# Patient Record
Sex: FEMALE | Race: BLACK OR AFRICAN AMERICAN | NOT HISPANIC OR LATINO | Employment: UNEMPLOYED | URBAN - METROPOLITAN AREA
[De-identification: names, ages, dates, MRNs, and addresses within clinical notes are randomized per-mention and may not be internally consistent; named-entity substitution may affect disease eponyms.]

---

## 2019-10-08 ENCOUNTER — HISTORICAL (OUTPATIENT)
Dept: RADIOLOGY | Facility: HOSPITAL | Age: 54
End: 2019-10-08

## 2019-10-08 LAB
ABS NEUT (OLG): 1.16 X10(3)/MCL (ref 2.1–9.2)
ALBUMIN SERPL-MCNC: 4 GM/DL (ref 3.4–5)
ALBUMIN/GLOB SERPL: 0.9 RATIO (ref 1.1–2)
ALP SERPL-CCNC: 142 UNIT/L (ref 45–117)
ALT SERPL-CCNC: 21 UNIT/L (ref 12–78)
APPEARANCE, UA: ABNORMAL
AST SERPL-CCNC: 16 UNIT/L (ref 15–37)
BACTERIA #/AREA URNS AUTO: ABNORMAL /[HPF]
BASOPHILS # BLD AUTO: 0 X10(3)/MCL (ref 0–0.2)
BASOPHILS NFR BLD AUTO: 1 %
BILIRUB SERPL-MCNC: 0.5 MG/DL (ref 0.2–1)
BILIRUB UR QL STRIP: NEGATIVE
BILIRUBIN DIRECT+TOT PNL SERPL-MCNC: 0.2 MG/DL (ref 0–0.2)
BILIRUBIN DIRECT+TOT PNL SERPL-MCNC: 0.3 MG/DL
BUN SERPL-MCNC: 12 MG/DL (ref 7–18)
CALCIUM SERPL-MCNC: 9.7 MG/DL (ref 8.5–10.1)
CHLORIDE SERPL-SCNC: 106 MMOL/L (ref 98–107)
CHOLEST SERPL-MCNC: 189 MG/DL
CHOLEST/HDLC SERPL: 2.5 {RATIO} (ref 0–4.4)
CO2 SERPL-SCNC: 29 MMOL/L (ref 21–32)
COLOR UR: YELLOW
CREAT SERPL-MCNC: 1 MG/DL (ref 0.6–1.3)
EOSINOPHIL # BLD AUTO: 0.2 X10(3)/MCL (ref 0–0.9)
EOSINOPHIL NFR BLD AUTO: 6 %
ERYTHROCYTE [DISTWIDTH] IN BLOOD BY AUTOMATED COUNT: 12.3 % (ref 11.5–14.5)
EST. AVERAGE GLUCOSE BLD GHB EST-MCNC: 148 MG/DL
GLOBULIN SER-MCNC: 4.3 GM/ML (ref 2.3–3.5)
GLUCOSE (UA): NORMAL
GLUCOSE SERPL-MCNC: 90 MG/DL (ref 74–106)
HBA1C MFR BLD: 6.8 % (ref 4.2–6.3)
HCT VFR BLD AUTO: 44.2 % (ref 35–46)
HDLC SERPL-MCNC: 76 MG/DL (ref 40–59)
HGB BLD-MCNC: 14.4 GM/DL (ref 12–16)
HGB UR QL STRIP: NEGATIVE
HYALINE CASTS #/AREA URNS LPF: ABNORMAL /[LPF]
IMM GRANULOCYTES # BLD AUTO: 0.01 10*3/UL
IMM GRANULOCYTES NFR BLD AUTO: 0 %
KETONES UR QL STRIP: ABNORMAL
LDLC SERPL CALC-MCNC: 103 MG/DL
LEUKOCYTE ESTERASE UR QL STRIP: 25 LEU/UL
LYMPHOCYTES # BLD AUTO: 1.8 X10(3)/MCL (ref 0.6–4.6)
LYMPHOCYTES NFR BLD AUTO: 48 %
MCH RBC QN AUTO: 30.4 PG (ref 26–34)
MCHC RBC AUTO-ENTMCNC: 32.6 GM/DL (ref 31–37)
MCV RBC AUTO: 93.2 FL (ref 80–100)
MONOCYTES # BLD AUTO: 0.4 X10(3)/MCL (ref 0.1–1.3)
MONOCYTES NFR BLD AUTO: 12 %
NEUTROPHILS # BLD AUTO: 1.16 X10(3)/MCL (ref 2.1–9.2)
NEUTROPHILS NFR BLD AUTO: 31 %
NITRITE UR QL STRIP: NEGATIVE
PH UR STRIP: 6 [PH] (ref 4.5–8)
PLATELET # BLD AUTO: 216 X10(3)/MCL (ref 130–400)
PMV BLD AUTO: 9.4 FL (ref 7.4–10.4)
POTASSIUM SERPL-SCNC: 4.6 MMOL/L (ref 3.5–5.1)
PROT SERPL-MCNC: 8.3 GM/DL (ref 6.4–8.2)
PROT UR QL STRIP: 20 MG/DL
RBC # BLD AUTO: 4.74 X10(6)/MCL (ref 4–5.2)
RBC #/AREA URNS AUTO: ABNORMAL /[HPF]
SODIUM SERPL-SCNC: 138 MMOL/L (ref 136–145)
SP GR UR STRIP: 1.03 (ref 1–1.03)
SQUAMOUS #/AREA URNS LPF: >100 /[LPF]
TRIGL SERPL-MCNC: 52 MG/DL
TSH SERPL-ACNC: 0.6 MIU/L (ref 0.36–3.74)
UROBILINOGEN UR STRIP-ACNC: 2 MG/DL
VLDLC SERPL CALC-MCNC: 10 MG/DL
WBC # SPEC AUTO: 3.7 X10(3)/MCL (ref 4.5–11)
WBC #/AREA URNS AUTO: ABNORMAL /HPF

## 2019-10-23 ENCOUNTER — HISTORICAL (OUTPATIENT)
Dept: INTERNAL MEDICINE | Facility: CLINIC | Age: 54
End: 2019-10-23

## 2020-02-25 ENCOUNTER — HISTORICAL (OUTPATIENT)
Dept: ADMINISTRATIVE | Facility: HOSPITAL | Age: 55
End: 2020-02-25

## 2020-02-25 ENCOUNTER — HISTORICAL (OUTPATIENT)
Dept: LAB | Facility: HOSPITAL | Age: 55
End: 2020-02-25

## 2020-02-25 LAB
ALBUMIN SERPL-MCNC: 3.5 GM/DL (ref 3.4–5)
ALBUMIN/GLOB SERPL: 0.8 RATIO (ref 1.1–2)
ALP SERPL-CCNC: 136 UNIT/L (ref 45–117)
ALT SERPL-CCNC: 25 UNIT/L (ref 12–78)
APPEARANCE, UA: CLEAR
AST SERPL-CCNC: 18 UNIT/L (ref 15–37)
BACTERIA #/AREA URNS AUTO: ABNORMAL /HPF
BILIRUB SERPL-MCNC: 0.4 MG/DL (ref 0.2–1)
BILIRUB UR QL STRIP: NEGATIVE
BILIRUBIN DIRECT+TOT PNL SERPL-MCNC: 0.1 MG/DL (ref 0–0.2)
BILIRUBIN DIRECT+TOT PNL SERPL-MCNC: 0.3 MG/DL
BUN SERPL-MCNC: 13 MG/DL (ref 7–18)
CALCIUM SERPL-MCNC: 9.1 MG/DL (ref 8.5–10.1)
CHLORIDE SERPL-SCNC: 109 MMOL/L (ref 98–107)
CO2 SERPL-SCNC: 26 MMOL/L (ref 21–32)
COLOR UR: YELLOW
CREAT SERPL-MCNC: 0.7 MG/DL (ref 0.6–1.3)
EST. AVERAGE GLUCOSE BLD GHB EST-MCNC: 148 MG/DL
GLOBULIN SER-MCNC: 4.4 GM/ML (ref 2.3–3.5)
GLUCOSE (UA): NEGATIVE
GLUCOSE SERPL-MCNC: 109 MG/DL (ref 74–106)
HBA1C MFR BLD: 6.8 % (ref 4.2–6.3)
HGB UR QL STRIP: NEGATIVE
HYALINE CASTS #/AREA URNS LPF: ABNORMAL /LPF
KETONES UR QL STRIP: NEGATIVE
LEUKOCYTE ESTERASE UR QL STRIP: NEGATIVE
NITRITE UR QL STRIP: NEGATIVE
PH UR STRIP: 6.5 [PH] (ref 4.5–8)
POTASSIUM SERPL-SCNC: 4.3 MMOL/L (ref 3.5–5.1)
PROT SERPL-MCNC: 7.9 GM/DL (ref 6.4–8.2)
PROT UR QL STRIP: NEGATIVE
RBC #/AREA URNS AUTO: ABNORMAL /HPF
SODIUM SERPL-SCNC: 139 MMOL/L (ref 136–145)
SP GR UR STRIP: 1.02 (ref 1–1.03)
SQUAMOUS #/AREA URNS LPF: >100 /LPF
UROBILINOGEN UR STRIP-ACNC: 2 MG/DL
WBC #/AREA URNS AUTO: ABNORMAL /HPF

## 2020-09-03 LAB
HIGH RISK HPV 16 (PRECISION): NEGATIVE
HIGH RISK HPV 18/45 (PRECISION): NEGATIVE
PAP RECOMMENDATION EXT: NORMAL
PAP SMEAR: NORMAL

## 2020-09-30 ENCOUNTER — HISTORICAL (OUTPATIENT)
Dept: INTERNAL MEDICINE | Facility: CLINIC | Age: 55
End: 2020-09-30

## 2020-09-30 LAB
ABS NEUT (OLG): 1.47 X10(3)/MCL (ref 2.1–9.2)
ALBUMIN SERPL-MCNC: 3.5 GM/DL (ref 3.4–5)
ALBUMIN/GLOB SERPL: 0.8 RATIO (ref 1.1–2)
ALP SERPL-CCNC: 141 UNIT/L (ref 45–117)
ALT SERPL-CCNC: 18 UNIT/L (ref 12–78)
APPEARANCE, UA: CLEAR
AST SERPL-CCNC: 11 UNIT/L (ref 15–37)
BACTERIA #/AREA URNS AUTO: ABNORMAL /HPF
BASOPHILS # BLD AUTO: 0 X10(3)/MCL (ref 0–0.2)
BASOPHILS NFR BLD AUTO: 1 %
BILIRUB SERPL-MCNC: 0.4 MG/DL (ref 0.2–1)
BILIRUB UR QL STRIP: NEGATIVE
BILIRUBIN DIRECT+TOT PNL SERPL-MCNC: 0.1 MG/DL (ref 0–0.2)
BILIRUBIN DIRECT+TOT PNL SERPL-MCNC: 0.3 MG/DL
BUN SERPL-MCNC: 14 MG/DL (ref 7–18)
CALCIUM SERPL-MCNC: 9.3 MG/DL (ref 8.5–10.1)
CHLORIDE SERPL-SCNC: 108 MMOL/L (ref 98–107)
CHOLEST SERPL-MCNC: 164 MG/DL
CHOLEST/HDLC SERPL: 2.7 {RATIO} (ref 0–4.4)
CO2 SERPL-SCNC: 28 MMOL/L (ref 21–32)
COLOR UR: YELLOW
CREAT SERPL-MCNC: 0.8 MG/DL (ref 0.6–1.3)
EOSINOPHIL # BLD AUTO: 0.4 X10(3)/MCL (ref 0–0.9)
EOSINOPHIL NFR BLD AUTO: 8 %
ERYTHROCYTE [DISTWIDTH] IN BLOOD BY AUTOMATED COUNT: 12.4 % (ref 11.5–14.5)
EST. AVERAGE GLUCOSE BLD GHB EST-MCNC: 131 MG/DL
GLOBULIN SER-MCNC: 4.2 GM/ML (ref 2.3–3.5)
GLUCOSE (UA): NEGATIVE
GLUCOSE SERPL-MCNC: 111 MG/DL (ref 74–106)
HBA1C MFR BLD: 6.2 % (ref 4.2–6.3)
HCT VFR BLD AUTO: 41.3 % (ref 35–46)
HDLC SERPL-MCNC: 61 MG/DL (ref 40–59)
HGB BLD-MCNC: 13.2 GM/DL (ref 12–16)
HGB UR QL STRIP: NEGATIVE
HYALINE CASTS #/AREA URNS LPF: ABNORMAL /LPF
IMM GRANULOCYTES # BLD AUTO: 0.01 10*3/UL
IMM GRANULOCYTES NFR BLD AUTO: 0 %
KETONES UR QL STRIP: NEGATIVE
LDLC SERPL CALC-MCNC: 88 MG/DL
LEUKOCYTE ESTERASE UR QL STRIP: NEGATIVE
LYMPHOCYTES # BLD AUTO: 2 X10(3)/MCL (ref 0.6–4.6)
LYMPHOCYTES NFR BLD AUTO: 45 %
MCH RBC QN AUTO: 30.1 PG (ref 26–34)
MCHC RBC AUTO-ENTMCNC: 32 GM/DL (ref 31–37)
MCV RBC AUTO: 94.1 FL (ref 80–100)
MONOCYTES # BLD AUTO: 0.5 X10(3)/MCL (ref 0.1–1.3)
MONOCYTES NFR BLD AUTO: 12 %
NEUTROPHILS # BLD AUTO: 1.47 X10(3)/MCL (ref 2.1–9.2)
NEUTROPHILS NFR BLD AUTO: 34 %
NITRITE UR QL STRIP: NEGATIVE
PH UR STRIP: 6 [PH] (ref 4.5–8)
PLATELET # BLD AUTO: 200 X10(3)/MCL (ref 130–400)
PMV BLD AUTO: 9.2 FL (ref 7.4–10.4)
POTASSIUM SERPL-SCNC: 4.3 MMOL/L (ref 3.5–5.1)
PROT SERPL-MCNC: 7.7 GM/DL (ref 6.4–8.2)
PROT UR QL STRIP: 10 MG/DL
RBC # BLD AUTO: 4.39 X10(6)/MCL (ref 4–5.2)
RBC #/AREA URNS AUTO: ABNORMAL /HPF
SODIUM SERPL-SCNC: 140 MMOL/L (ref 136–145)
SP GR UR STRIP: 1.02 (ref 1–1.03)
SQUAMOUS #/AREA URNS LPF: >100 /LPF
TRIGL SERPL-MCNC: 75 MG/DL
TSH SERPL-ACNC: 1.67 MIU/L (ref 0.36–3.74)
UROBILINOGEN UR STRIP-ACNC: 2 MG/DL
VLDLC SERPL CALC-MCNC: 15 MG/DL
WBC # SPEC AUTO: 4.4 X10(3)/MCL (ref 4.5–11)
WBC #/AREA URNS AUTO: ABNORMAL /HPF

## 2020-12-07 ENCOUNTER — HISTORICAL (OUTPATIENT)
Dept: RADIOLOGY | Facility: HOSPITAL | Age: 55
End: 2020-12-07

## 2021-01-19 ENCOUNTER — HISTORICAL (OUTPATIENT)
Dept: INTERNAL MEDICINE | Facility: CLINIC | Age: 56
End: 2021-01-19

## 2021-01-19 LAB
APPEARANCE, UA: ABNORMAL
BACTERIA #/AREA URNS AUTO: ABNORMAL /HPF
BILIRUB UR QL STRIP: NEGATIVE
BUN SERPL-MCNC: 12 MG/DL (ref 9.8–20.1)
CALCIUM SERPL-MCNC: 9.9 MG/DL (ref 8.4–10.2)
CHLORIDE SERPL-SCNC: 104 MMOL/L (ref 98–107)
CO2 SERPL-SCNC: 26 MMOL/L (ref 22–29)
COLOR UR: YELLOW
CREAT SERPL-MCNC: 0.77 MG/DL (ref 0.55–1.02)
CREAT/UREA NIT SERPL: 16
EST. AVERAGE GLUCOSE BLD GHB EST-MCNC: 116.9 MG/DL
GLUCOSE (UA): NEGATIVE
GLUCOSE SERPL-MCNC: 95 MG/DL (ref 74–100)
HBA1C MFR BLD: 5.7 %
HGB UR QL STRIP: NEGATIVE
HYALINE CASTS #/AREA URNS LPF: ABNORMAL /LPF
KETONES UR QL STRIP: NEGATIVE
LEUKOCYTE ESTERASE UR QL STRIP: NEGATIVE
NITRITE UR QL STRIP: NEGATIVE
PH UR STRIP: 5.5 [PH] (ref 4.5–8)
POTASSIUM SERPL-SCNC: 4.5 MMOL/L (ref 3.5–5.1)
PROT UR QL STRIP: 20 MG/DL
RBC #/AREA URNS AUTO: ABNORMAL /HPF
SODIUM SERPL-SCNC: 139 MMOL/L (ref 136–145)
SP GR UR STRIP: 1.02 (ref 1–1.03)
SQUAMOUS #/AREA URNS LPF: >100 /LPF
UROBILINOGEN UR STRIP-ACNC: 2 MG/DL
WBC #/AREA URNS AUTO: ABNORMAL /HPF

## 2021-06-01 ENCOUNTER — HISTORICAL (OUTPATIENT)
Dept: INTERNAL MEDICINE | Facility: CLINIC | Age: 56
End: 2021-06-01

## 2021-06-01 LAB
ABS NEUT (OLG): 1.35 X10(3)/MCL (ref 2.1–9.2)
ALBUMIN SERPL-MCNC: 3.9 GM/DL (ref 3.5–5)
ALBUMIN/GLOB SERPL: 1 RATIO (ref 1.1–2)
ALP SERPL-CCNC: 148 UNIT/L (ref 40–150)
ALT SERPL-CCNC: 14 UNIT/L (ref 0–55)
AST SERPL-CCNC: 15 UNIT/L (ref 5–34)
BASOPHILS # BLD AUTO: 0.1 X10(3)/MCL (ref 0–0.2)
BASOPHILS NFR BLD AUTO: 2 %
BILIRUB SERPL-MCNC: 0.5 MG/DL
BILIRUBIN DIRECT+TOT PNL SERPL-MCNC: 0.2 MG/DL (ref 0–0.5)
BILIRUBIN DIRECT+TOT PNL SERPL-MCNC: 0.3 MG/DL (ref 0–0.8)
BUN SERPL-MCNC: 12.3 MG/DL (ref 9.8–20.1)
CALCIUM SERPL-MCNC: 9.9 MG/DL (ref 8.4–10.2)
CHLORIDE SERPL-SCNC: 105 MMOL/L (ref 98–107)
CO2 SERPL-SCNC: 25 MMOL/L (ref 22–29)
CREAT SERPL-MCNC: 0.79 MG/DL (ref 0.55–1.02)
EOSINOPHIL # BLD AUTO: 0.3 X10(3)/MCL (ref 0–0.9)
EOSINOPHIL NFR BLD AUTO: 7 %
ERYTHROCYTE [DISTWIDTH] IN BLOOD BY AUTOMATED COUNT: 12 % (ref 11.5–14.5)
EST. AVERAGE GLUCOSE BLD GHB EST-MCNC: 116.9 MG/DL
GLOBULIN SER-MCNC: 3.9 GM/DL (ref 2.4–3.5)
GLUCOSE SERPL-MCNC: 123 MG/DL (ref 74–100)
HBA1C MFR BLD: 5.7 %
HCT VFR BLD AUTO: 41.4 % (ref 35–46)
HGB BLD-MCNC: 13.4 GM/DL (ref 12–16)
IMM GRANULOCYTES # BLD AUTO: 0.01 10*3/UL
IMM GRANULOCYTES NFR BLD AUTO: 0 %
LYMPHOCYTES # BLD AUTO: 1.8 X10(3)/MCL (ref 0.6–4.6)
LYMPHOCYTES NFR BLD AUTO: 46 %
MCH RBC QN AUTO: 29.9 PG (ref 26–34)
MCHC RBC AUTO-ENTMCNC: 32.4 GM/DL (ref 31–37)
MCV RBC AUTO: 92.4 FL (ref 80–100)
MONOCYTES # BLD AUTO: 0.4 X10(3)/MCL (ref 0.1–1.3)
MONOCYTES NFR BLD AUTO: 11 %
NEUTROPHILS # BLD AUTO: 1.35 X10(3)/MCL (ref 2.1–9.2)
NEUTROPHILS NFR BLD AUTO: 34 %
NRBC BLD AUTO-RTO: 0 % (ref 0–0.2)
PLATELET # BLD AUTO: 218 X10(3)/MCL (ref 130–400)
PMV BLD AUTO: 9.4 FL (ref 7.4–10.4)
POTASSIUM SERPL-SCNC: 4 MMOL/L (ref 3.5–5.1)
PROT SERPL-MCNC: 7.8 GM/DL (ref 6.4–8.3)
RBC # BLD AUTO: 4.48 X10(6)/MCL (ref 4–5.2)
SODIUM SERPL-SCNC: 138 MMOL/L (ref 136–145)
WBC # SPEC AUTO: 3.9 X10(3)/MCL (ref 4.5–11)

## 2021-10-04 ENCOUNTER — HISTORICAL (OUTPATIENT)
Dept: ADMINISTRATIVE | Facility: HOSPITAL | Age: 56
End: 2021-10-04

## 2021-10-04 LAB
ABS NEUT (OLG): 1.6 X10(3)/MCL (ref 2.1–9.2)
ALBUMIN SERPL-MCNC: 3.8 GM/DL (ref 3.5–5)
ALBUMIN/GLOB SERPL: 0.9 RATIO (ref 1.1–2)
ALP SERPL-CCNC: 135 UNIT/L (ref 40–150)
ALT SERPL-CCNC: 10 UNIT/L (ref 0–55)
APPEARANCE, UA: CLEAR
AST SERPL-CCNC: 15 UNIT/L (ref 5–34)
BACTERIA #/AREA URNS AUTO: ABNORMAL /HPF
BASOPHILS # BLD AUTO: 0.1 X10(3)/MCL (ref 0–0.2)
BASOPHILS NFR BLD AUTO: 1 %
BILIRUB SERPL-MCNC: 0.4 MG/DL
BILIRUB UR QL STRIP: NEGATIVE
BILIRUBIN DIRECT+TOT PNL SERPL-MCNC: 0.2 MG/DL (ref 0–0.5)
BILIRUBIN DIRECT+TOT PNL SERPL-MCNC: 0.2 MG/DL (ref 0–0.8)
BUN SERPL-MCNC: 17.2 MG/DL (ref 9.8–20.1)
CALCIUM SERPL-MCNC: 10.4 MG/DL (ref 8.4–10.2)
CHLORIDE SERPL-SCNC: 106 MMOL/L (ref 98–107)
CHOLEST SERPL-MCNC: 177 MG/DL
CHOLEST/HDLC SERPL: 3 {RATIO} (ref 0–5)
CO2 SERPL-SCNC: 24 MMOL/L (ref 22–29)
COLOR UR: NORMAL
CREAT SERPL-MCNC: 0.76 MG/DL (ref 0.55–1.02)
CREAT UR-MCNC: 92.6 MG/DL (ref 45–106)
EOSINOPHIL # BLD AUTO: 0.3 X10(3)/MCL (ref 0–0.9)
EOSINOPHIL NFR BLD AUTO: 8 %
ERYTHROCYTE [DISTWIDTH] IN BLOOD BY AUTOMATED COUNT: 12.2 % (ref 11.5–14.5)
EST. AVERAGE GLUCOSE BLD GHB EST-MCNC: 116.9 MG/DL
GLOBULIN SER-MCNC: 4.2 GM/DL (ref 2.4–3.5)
GLUCOSE (UA): NEGATIVE
GLUCOSE SERPL-MCNC: 117 MG/DL (ref 74–100)
HBA1C MFR BLD: 5.7 %
HCT VFR BLD AUTO: 42.7 % (ref 35–46)
HDLC SERPL-MCNC: 52 MG/DL (ref 35–60)
HGB BLD-MCNC: 13.9 GM/DL (ref 12–16)
HGB UR QL STRIP: NEGATIVE
HYALINE CASTS #/AREA URNS LPF: ABNORMAL /LPF
IMM GRANULOCYTES # BLD AUTO: 0.01 10*3/UL
IMM GRANULOCYTES NFR BLD AUTO: 0 %
KETONES UR QL STRIP: NEGATIVE
LDLC SERPL CALC-MCNC: 112 MG/DL (ref 50–140)
LEUKOCYTE ESTERASE UR QL STRIP: NEGATIVE
LYMPHOCYTES # BLD AUTO: 1.8 X10(3)/MCL (ref 0.6–4.6)
LYMPHOCYTES NFR BLD AUTO: 42 %
MCH RBC QN AUTO: 30 PG (ref 26–34)
MCHC RBC AUTO-ENTMCNC: 32.6 GM/DL (ref 31–37)
MCV RBC AUTO: 92 FL (ref 80–100)
MICROALBUMIN UR-MCNC: 9.4 MG/L
MICROALBUMIN/CREAT RATIO PNL UR: 10.2 MG/GM CR (ref 0–30)
MONOCYTES # BLD AUTO: 0.5 X10(3)/MCL (ref 0.1–1.3)
MONOCYTES NFR BLD AUTO: 12 %
NEUTROPHILS # BLD AUTO: 1.6 X10(3)/MCL (ref 2.1–9.2)
NEUTROPHILS NFR BLD AUTO: 37 %
NITRITE UR QL STRIP: NEGATIVE
NRBC BLD AUTO-RTO: 0 % (ref 0–0.2)
PH UR STRIP: 5.5 [PH] (ref 4.5–8)
PLATELET # BLD AUTO: 231 X10(3)/MCL (ref 130–400)
PMV BLD AUTO: 9.6 FL (ref 7.4–10.4)
POTASSIUM SERPL-SCNC: 4.2 MMOL/L (ref 3.5–5.1)
PROT SERPL-MCNC: 8 GM/DL (ref 6.4–8.3)
PROT UR QL STRIP: NEGATIVE
RBC # BLD AUTO: 4.64 X10(6)/MCL (ref 4–5.2)
RBC #/AREA URNS AUTO: ABNORMAL /HPF
SODIUM SERPL-SCNC: 138 MMOL/L (ref 136–145)
SP GR UR STRIP: 1.02 (ref 1–1.03)
SQUAMOUS #/AREA URNS LPF: ABNORMAL /LPF
TRIGL SERPL-MCNC: 66 MG/DL (ref 37–140)
TSH SERPL-ACNC: 1.26 UIU/ML (ref 0.35–4.94)
UROBILINOGEN UR STRIP-ACNC: NORMAL
VLDLC SERPL CALC-MCNC: 13 MG/DL
WBC # SPEC AUTO: 4.3 X10(3)/MCL (ref 4.5–11)
WBC #/AREA URNS AUTO: ABNORMAL /HPF

## 2021-12-09 ENCOUNTER — HISTORICAL (OUTPATIENT)
Dept: RADIOLOGY | Facility: HOSPITAL | Age: 56
End: 2021-12-09

## 2022-04-12 ENCOUNTER — HISTORICAL (OUTPATIENT)
Dept: ADMINISTRATIVE | Facility: HOSPITAL | Age: 57
End: 2022-04-12

## 2022-04-30 VITALS
HEIGHT: 72 IN | BODY MASS INDEX: 28.82 KG/M2 | OXYGEN SATURATION: 100 % | SYSTOLIC BLOOD PRESSURE: 122 MMHG | WEIGHT: 212.75 LBS | DIASTOLIC BLOOD PRESSURE: 80 MMHG

## 2022-06-30 ENCOUNTER — LAB VISIT (OUTPATIENT)
Dept: LAB | Facility: HOSPITAL | Age: 57
End: 2022-06-30
Attending: NURSE PRACTITIONER
Payer: MEDICAID

## 2022-06-30 DIAGNOSIS — E11.9 DM (DIABETES MELLITUS): Primary | ICD-10-CM

## 2022-06-30 LAB
ALBUMIN SERPL-MCNC: 3.5 GM/DL (ref 3.5–5)
ALBUMIN/GLOB SERPL: 0.9 RATIO (ref 1.1–2)
ALP SERPL-CCNC: 121 UNIT/L (ref 40–150)
ALT SERPL-CCNC: 11 UNIT/L (ref 0–55)
AST SERPL-CCNC: 13 UNIT/L (ref 5–34)
BASOPHILS # BLD AUTO: 0.04 X10(3)/MCL (ref 0–0.2)
BASOPHILS NFR BLD AUTO: 1.1 %
BILIRUBIN DIRECT+TOT PNL SERPL-MCNC: 0.4 MG/DL
BUN SERPL-MCNC: 10 MG/DL (ref 9.8–20.1)
CALCIUM SERPL-MCNC: 9.4 MG/DL (ref 8.4–10.2)
CHLORIDE SERPL-SCNC: 105 MMOL/L (ref 98–107)
CO2 SERPL-SCNC: 26 MMOL/L (ref 22–29)
CREAT SERPL-MCNC: 0.8 MG/DL (ref 0.55–1.02)
EOSINOPHIL # BLD AUTO: 0.19 X10(3)/MCL (ref 0–0.9)
EOSINOPHIL NFR BLD AUTO: 5.4 %
ERYTHROCYTE [DISTWIDTH] IN BLOOD BY AUTOMATED COUNT: 12.5 % (ref 11.5–17)
EST. AVERAGE GLUCOSE BLD GHB EST-MCNC: 114 MG/DL
GLOBULIN SER-MCNC: 3.8 GM/DL (ref 2.4–3.5)
GLUCOSE SERPL-MCNC: 112 MG/DL (ref 74–100)
HBA1C MFR BLD: 5.6 %
HCT VFR BLD AUTO: 40.9 % (ref 37–47)
HGB BLD-MCNC: 13.4 GM/DL (ref 12–16)
IMM GRANULOCYTES # BLD AUTO: 0 X10(3)/MCL (ref 0–0.02)
IMM GRANULOCYTES NFR BLD AUTO: 0 % (ref 0–0.43)
LYMPHOCYTES # BLD AUTO: 1.42 X10(3)/MCL (ref 0.6–4.6)
LYMPHOCYTES NFR BLD AUTO: 40.5 %
MCH RBC QN AUTO: 30.1 PG (ref 27–31)
MCHC RBC AUTO-ENTMCNC: 32.8 MG/DL (ref 33–36)
MCV RBC AUTO: 91.9 FL (ref 80–94)
MONOCYTES # BLD AUTO: 0.4 X10(3)/MCL (ref 0.1–1.3)
MONOCYTES NFR BLD AUTO: 11.4 %
NEUTROPHILS # BLD AUTO: 1.5 X10(3)/MCL (ref 2.1–9.2)
NEUTROPHILS NFR BLD AUTO: 41.6 %
NRBC BLD AUTO-RTO: 0 %
PLATELET # BLD AUTO: 206 X10(3)/MCL (ref 130–400)
PMV BLD AUTO: 10.1 FL (ref 7.4–10.4)
POTASSIUM SERPL-SCNC: 3.9 MMOL/L (ref 3.5–5.1)
PROT SERPL-MCNC: 7.3 GM/DL (ref 6.4–8.3)
RBC # BLD AUTO: 4.45 X10(6)/MCL (ref 4.2–5.4)
SODIUM SERPL-SCNC: 138 MMOL/L (ref 136–145)
WBC # SPEC AUTO: 3.5 X10(3)/MCL (ref 4.5–11.5)

## 2022-06-30 PROCEDURE — 36415 COLL VENOUS BLD VENIPUNCTURE: CPT

## 2022-06-30 PROCEDURE — 80053 COMPREHEN METABOLIC PANEL: CPT

## 2022-06-30 PROCEDURE — 83036 HEMOGLOBIN GLYCOSYLATED A1C: CPT

## 2022-06-30 PROCEDURE — 85025 COMPLETE CBC W/AUTO DIFF WBC: CPT

## 2022-07-11 RX ORDER — OMEPRAZOLE 20 MG/1
20 CAPSULE, DELAYED RELEASE ORAL
COMMUNITY
Start: 2021-10-04 | End: 2022-07-12 | Stop reason: SDUPTHER

## 2022-07-11 RX ORDER — METFORMIN HYDROCHLORIDE 500 MG/1
500 TABLET, EXTENDED RELEASE ORAL
COMMUNITY
Start: 2021-10-04 | End: 2022-11-01 | Stop reason: ALTCHOICE

## 2022-07-11 RX ORDER — LISINOPRIL 2.5 MG/1
2.5 TABLET ORAL
COMMUNITY
Start: 2021-10-04 | End: 2022-07-12 | Stop reason: SDUPTHER

## 2022-07-11 RX ORDER — PRAVASTATIN SODIUM 10 MG/1
10 TABLET ORAL
COMMUNITY
Start: 2021-10-04 | End: 2022-07-12 | Stop reason: SDUPTHER

## 2022-07-12 ENCOUNTER — OFFICE VISIT (OUTPATIENT)
Dept: INTERNAL MEDICINE | Facility: CLINIC | Age: 57
End: 2022-07-12
Payer: MEDICAID

## 2022-07-12 VITALS
HEIGHT: 72 IN | WEIGHT: 213.19 LBS | RESPIRATION RATE: 20 BRPM | BODY MASS INDEX: 28.88 KG/M2 | TEMPERATURE: 98 F | DIASTOLIC BLOOD PRESSURE: 72 MMHG | SYSTOLIC BLOOD PRESSURE: 120 MMHG | HEART RATE: 57 BPM

## 2022-07-12 DIAGNOSIS — K59.09 OTHER CONSTIPATION: ICD-10-CM

## 2022-07-12 DIAGNOSIS — L84 CORNS AND CALLUS: ICD-10-CM

## 2022-07-12 DIAGNOSIS — D70.9 NEUTROPENIA, UNSPECIFIED TYPE: ICD-10-CM

## 2022-07-12 DIAGNOSIS — M21.612 BILATERAL BUNIONS: ICD-10-CM

## 2022-07-12 DIAGNOSIS — M21.611 BILATERAL BUNIONS: ICD-10-CM

## 2022-07-12 DIAGNOSIS — E11.9 TYPE 2 DIABETES MELLITUS WITHOUT COMPLICATION, WITHOUT LONG-TERM CURRENT USE OF INSULIN: Primary | ICD-10-CM

## 2022-07-12 DIAGNOSIS — K21.9 GASTROESOPHAGEAL REFLUX DISEASE WITHOUT ESOPHAGITIS: ICD-10-CM

## 2022-07-12 DIAGNOSIS — E66.3 OVERWEIGHT: ICD-10-CM

## 2022-07-12 PROBLEM — M25.476 SWELLING OF FIRST METATARSOPHALANGEAL (MTP) JOINT: Status: ACTIVE | Noted: 2022-07-12

## 2022-07-12 PROCEDURE — 99213 OFFICE O/P EST LOW 20 MIN: CPT | Mod: PBBFAC | Performed by: NURSE PRACTITIONER

## 2022-07-12 PROCEDURE — 99214 PR OFFICE/OUTPT VISIT, EST, LEVL IV, 30-39 MIN: ICD-10-PCS | Mod: S$PBB,,, | Performed by: NURSE PRACTITIONER

## 2022-07-12 PROCEDURE — 99214 OFFICE O/P EST MOD 30 MIN: CPT | Mod: S$PBB,,, | Performed by: NURSE PRACTITIONER

## 2022-07-12 RX ORDER — OMEPRAZOLE 20 MG/1
20 CAPSULE, DELAYED RELEASE ORAL DAILY
Qty: 90 CAPSULE | Refills: 1 | Status: SHIPPED | OUTPATIENT
Start: 2022-07-12 | End: 2022-11-01 | Stop reason: SDUPTHER

## 2022-07-12 RX ORDER — PRAVASTATIN SODIUM 10 MG/1
10 TABLET ORAL NIGHTLY
Qty: 90 TABLET | Refills: 1 | Status: SHIPPED | OUTPATIENT
Start: 2022-07-12 | End: 2022-11-01 | Stop reason: ALTCHOICE

## 2022-07-12 RX ORDER — LISINOPRIL 2.5 MG/1
2.5 TABLET ORAL DAILY
Qty: 90 TABLET | Refills: 1 | Status: SHIPPED | OUTPATIENT
Start: 2022-07-12 | End: 2022-11-01 | Stop reason: ALTCHOICE

## 2022-07-12 NOTE — ASSESSMENT & PLAN NOTE
A1C 5.6. Previous A1C 5.7.  Pt would like to see if glucose can be maintained without meds.  Hold metformin  mg until next visit.  Refilled pravastatin 10 mg at bedtime.  Continue Asa 81 mg daily.  Refilled lisinopril 2.5 mg daily.  Continue medications as prescribed.  Educated on not eating largest meal of the day just before bed.  Follow ADA diet.  Avoid soda, simple sweets, and limit rice/pasta/bread/starches and consume brown options when possible.   Maintain healthy weight with BMI goal <30.   Perform aerobic exercise for 150 minutes per week (or 5 days a week for 30 minutes each day).   Examine feet daily.   Obtain annual dilated eye exam.  Eye exam: 10/4/21  Foot exam: 7/12/22

## 2022-07-12 NOTE — ASSESSMENT & PLAN NOTE
Will refer to wound clinic for DM foot care if indigent care received to eval and treat.  Lukewarm epsom salt foot soaks then use pumice stone to remove softened skin.  Dry feet throughly and in between toes.  Keep feet moisturized.  Dry feet and change socks when wet.  Use callus/corn pads as directed on label.

## 2022-07-12 NOTE — ASSESSMENT & PLAN NOTE
BMI 29. Goal BMI <25.  Aerobic exercise 150 minutes per week.  Avoid soda, simple sugars, sweets, excessive rice, pasta, potatoes or bread.   Choose brown options when available and portion control.  Limit fast foods and fried foods.   Choose complex carbs in moderation (ex: green, leafy vegetables, beans, oatmeal).  Eat plenty of fresh fruits and vegetables with lean meats daily.   Consider permanent healthy lifestyle changes.

## 2022-07-12 NOTE — ASSESSMENT & PLAN NOTE
Continue omeprazole prn.  Avoid spicy, acidic, fried food and alcohol.   Eat 2-3 hours before bed.  Avoid tight fitting clothes and chew food thoroughly.   Reduce caffeine intake, avoid soda.   Take meds as prescribed.

## 2022-07-12 NOTE — PROGRESS NOTES
LAURA Luna   [facility]  [address] [facility] [address]      PATIENT NAME: Barbara Diaz  : 1965  DATE: 22  MRN: [id number]      Billing Provider: LAURA Luna  Level of Service:   Patient PCP Information     Provider PCP Type    LAURA Luna General          Reason for Visit / Chief Complaint: Follow-up (Lab results)       History of Present Illness / Problem Focused Workflow     Barbara Diaz is a 57 y.o. Black or  female presents to the clinic for f/u visit. PMH DM, GERD and obesity. Followed by [facility]. Mostly follows ADA diet but drinks a little soda in the am to get her day started. Reports med compliance. Has been worrying about brother who has severe dementia and has been wandering. Niece is not caring for him as she should. Labs reviewed with pt. Denies chest pain, shortness of breath, cough, fever, headache, dizziness, weakness, abdominal pain, nausea, vomiting, diarrhea, constipation, dysuria, depression, anxiety, SI/HI.    Cervical Cancer Screening - Last Pap/pelvic on 10/1/21. Follow up with GYN Clinic for annual Pap/Pelvic.  Breast Cancer Screening - Last Mammogram 21. Birads 1. Follow up annually.  Colon Cancer Screening - FIT negative on 10/14/21. Follow up annually.  Vaccinations: Flu - / Tetanus - declines both.  Labwork - UTD    Review of Systems     Review of Systems   Constitutional: Negative.    HENT: Negative.    Eyes: Negative.    Respiratory: Negative.    Cardiovascular: Negative.    Gastrointestinal: Negative.    Endocrine: Negative.    Genitourinary: Negative.    Musculoskeletal: Negative.    Integumentary:  Negative.   Neurological: Negative.    Psychiatric/Behavioral: Negative.         Medical / Social / Family History     Past Medical History:   Diagnosis Date    Allergy     Diabetes mellitus, type 2     GERD (gastroesophageal reflux disease)         Past Surgical History:   Procedure Laterality Date    TUBAL LIGATION         Social History  Ms. Diaz  reports that she has never smoked. She has never used smokeless tobacco. She reports current alcohol use. She reports that she does not use drugs.    Family History  Ms. Diaz's family history includes Cancer in her mother; Cerebral aneurysm in her brother; Diabetes type I in her brother and sister; Heart disease in her mother; Hypertension in her brother; Prostate cancer in her father.    Medications and Allergies     Medications  Medication List with Changes/Refills   Current Medications    METFORMIN (GLUCOPHAGE-XR) 500 MG ER 24HR TABLET    Take 500 mg by mouth.   Changed and/or Refilled Medications    Modified Medication Previous Medication    LISINOPRIL (PRINIVIL,ZESTRIL) 2.5 MG TABLET lisinopriL (PRINIVIL,ZESTRIL) 2.5 MG tablet       Take 1 tablet (2.5 mg total) by mouth once daily.    Take 2.5 mg by mouth.    OMEPRAZOLE (PRILOSEC) 20 MG CAPSULE omeprazole (PRILOSEC) 20 MG capsule       Take 1 capsule (20 mg total) by mouth once daily.    Take 20 mg by mouth.    PRAVASTATIN (PRAVACHOL) 10 MG TABLET pravastatin (PRAVACHOL) 10 MG tablet       Take 1 tablet (10 mg total) by mouth every evening.    Take 10 mg by mouth.         Allergies  Review of patient's allergies indicates:   Allergen Reactions    Codeine        Physical Examination       Physical Exam  Vitals reviewed.   Constitutional:       Appearance: Normal appearance.   HENT:      Head: Normocephalic.   Eyes:      Pupils: Pupils are equal, round, and reactive to light.   Cardiovascular:      Rate and Rhythm: Normal rate and regular rhythm.      Pulses:           Dorsalis pedis pulses are 2+ on the right side and 2+ on the left side.        Posterior tibial pulses are 2+ on the right side and 2+ on the left side.      Heart sounds: Normal heart sounds.   Pulmonary:      Effort: Pulmonary effort is normal.      Breath sounds: Normal breath  sounds.   Abdominal:      General: Bowel sounds are normal.      Palpations: Abdomen is soft.   Musculoskeletal:         General: Normal range of motion.      Cervical back: Normal range of motion.      Right foot: Bunion present.      Left foot: Bunion present.   Feet:      Right foot:      Protective Sensation: 9 sites tested. 9 sites sensed.      Skin integrity: Callus present.      Toenail Condition: Right toenails are normal.      Left foot:      Protective Sensation: 9 sites tested. 9 sites sensed.      Skin integrity: Callus present.      Toenail Condition: Left toenails are normal.      Comments: Corns to bilateral second and fifth toes.  Skin:     General: Skin is warm and dry.   Neurological:      General: No focal deficit present.      Mental Status: She is alert and oriented to person, place, and time.   Psychiatric:         Mood and Affect: Mood normal.         Speech: Speech normal.         Behavior: Behavior is cooperative.         Judgment: Judgment normal.           Results     Lab Results   Component Value Date    WBC 3.5 (L) 06/30/2022    RBC 4.45 06/30/2022    HGB 13.4 06/30/2022    HCT 40.9 06/30/2022    MCV 91.9 06/30/2022    MCH 30.1 06/30/2022    MCHC 32.8 (L) 06/30/2022    RDW 12.5 06/30/2022     06/30/2022    MPV 10.1 06/30/2022     Sodium Level   Date Value Ref Range Status   06/30/2022 138 136 - 145 mmol/L Final     Potassium Level   Date Value Ref Range Status   06/30/2022 3.9 3.5 - 5.1 mmol/L Final     Carbon Dioxide   Date Value Ref Range Status   06/30/2022 26 22 - 29 mmol/L Final     Blood Urea Nitrogen   Date Value Ref Range Status   06/30/2022 10.0 9.8 - 20.1 mg/dL Final     Creatinine   Date Value Ref Range Status   06/30/2022 0.80 0.55 - 1.02 mg/dL Final     Calcium Level Total   Date Value Ref Range Status   06/30/2022 9.4 8.4 - 10.2 mg/dL Final     Albumin Level   Date Value Ref Range Status   06/30/2022 3.5 3.5 - 5.0 gm/dL Final     Bilirubin Total   Date Value Ref  Range Status   06/30/2022 0.4 <=1.5 mg/dL Final     Alkaline Phosphatase   Date Value Ref Range Status   06/30/2022 121 40 - 150 unit/L Final     Aspartate Aminotransferase   Date Value Ref Range Status   06/30/2022 13 5 - 34 unit/L Final     Alanine Aminotransferase   Date Value Ref Range Status   06/30/2022 11 0 - 55 unit/L Final     Estimated GFR-Non    Date Value Ref Range Status   10/04/2021 83 (L) >>=90 mL/min/1.73 m2 Final     Lab Results   Component Value Date    CHOL 177 10/04/2021     Lab Results   Component Value Date    HDL 52 10/04/2021     No results found for: LDLCALC  Lab Results   Component Value Date    TRIG 66 10/04/2021     No results found for: CHOLHDL  Lab Results   Component Value Date    TSH 1.2610 10/04/2021     Lab Results   Component Value Date    PHUR 5.5 01/19/2021    PROTEINUA Negative 10/04/2021    GLUCUA Negative 01/19/2021    KETONESU Negative 01/19/2021    OCCULTUA Negative 10/04/2021    NITRITE Negative 01/19/2021    LEUKOCYTESUR Negative 01/19/2021     Lab Results   Component Value Date    HGBA1C 5.6 06/30/2022    HGBA1C 5.7 10/04/2021    HGBA1C 5.7 06/01/2021     No results found for: MICROALBUR, [id number]   No results found for this or any previous visit.         Assessment and Plan (including Health Maintenance)     Plan:         Health Maintenance Due   Topic Date Due    Hepatitis C Screening  Never done    COVID-19 Vaccine (1) Never done    HIV Screening  Never done    TETANUS VACCINE  Never done    Colorectal Cancer Screening  Never done    Shingles Vaccine (1 of 2) Never done       Problem List Items Addressed This Visit        Derm    Corns and callus    Current Assessment & Plan     Will refer to wound clinic for DM foot care if indigent care received to eval and treat.  Lukewarm epsom salt foot soaks then use pumice stone to remove softened skin.  Dry feet throughly and in between toes.  Keep feet moisturized.  Dry feet and change socks when  wet.  Use callus/corn pads as directed on label.                Oncology    Neutropenia    Current Assessment & Plan     Continues to remain below 4.5.  WBC 3.5.  Workup ordered.           Relevant Orders    Fibrinogen    Hematopath Consult, Peripheral Smear    Protime-INR    APTT    Uric Acid    Leukocyte alkaline phosphatase    CBC Auto Differential       Endocrine    Overweight    Current Assessment & Plan     BMI 29. Goal BMI <25.  Aerobic exercise 150 minutes per week.  Avoid soda, simple sugars, sweets, excessive rice, pasta, potatoes or bread.   Choose brown options when available and portion control.  Limit fast foods and fried foods.   Choose complex carbs in moderation (ex: green, leafy vegetables, beans, oatmeal).  Eat plenty of fresh fruits and vegetables with lean meats daily.   Consider permanent healthy lifestyle changes.             Diabetes mellitus - Primary    Current Assessment & Plan     A1C 5.6. Previous A1C 5.7.  Pt would like to see if glucose can be maintained without meds.  Hold metformin  mg.  Refilled pravastatin 10 mg at bedtime.  Continue Asa 81 mg daily.  Refilled lisinopril 2.5 mg daily.  Continue medications as prescribed.  Educated on not eating largest meal of the day just before bed.  Follow ADA diet.  Avoid soda, simple sweets, and limit rice/pasta/bread/starches and consume brown options when possible.   Maintain healthy weight with BMI goal <30.   Perform aerobic exercise for 150 minutes per week (or 5 days a week for 30 minutes each day).   Examine feet daily.   Obtain annual dilated eye exam.  Eye exam: 10/4/21  Foot exam: 7/12/22             Relevant Medications    metFORMIN (GLUCOPHAGE-XR) 500 MG ER 24hr tablet    Other Relevant Orders    Hemoglobin A1C       GI    Gastroesophageal reflux disease    Current Assessment & Plan     Continue omeprazole prn.  Avoid spicy, acidic, fried food and alcohol.   Eat 2-3 hours before bed.  Avoid tight fitting clothes and chew food  thoroughly.   Reduce caffeine intake, avoid soda.   Take meds as prescribed.              Other constipation       Orthopedic    Bilateral bunions    Current Assessment & Plan     Encouraged to wear bunion correctors at bedtime.  Cannot refer to podiatry d/t no entitlements.                 Health Maintenance Topics with due status: Not Due       Topic Last Completion Date    Cervical Cancer Screening 09/03/2020    Lipid Panel 10/04/2021    Mammogram 12/09/2021    Influenza Vaccine Not Due       Future Appointments   Date Time Provider Department Center   9/16/2022  7:30 AM Lexie Salinas MD [facility] TIARA Miguel   10/13/2022  1:30 PM LAURA Kelly [facility] ROSA Gandara Un            Signature:  LAURA Luan  [facility]  [address] [facility] [address]    Date of encounter: 7/12/22

## 2022-10-13 DIAGNOSIS — D70.9 NEUTROPENIA, UNSPECIFIED TYPE: Primary | ICD-10-CM

## 2022-10-17 ENCOUNTER — LAB VISIT (OUTPATIENT)
Dept: LAB | Facility: HOSPITAL | Age: 57
End: 2022-10-17
Attending: NURSE PRACTITIONER
Payer: MEDICAID

## 2022-10-17 DIAGNOSIS — D70.9 NEUTROPENIA, UNSPECIFIED TYPE: ICD-10-CM

## 2022-10-17 DIAGNOSIS — E11.9 TYPE 2 DIABETES MELLITUS WITHOUT COMPLICATION, WITHOUT LONG-TERM CURRENT USE OF INSULIN: ICD-10-CM

## 2022-10-17 LAB
BASOPHILS # BLD AUTO: 0.04 X10(3)/MCL (ref 0–0.2)
BASOPHILS NFR BLD AUTO: 1.1 %
EOSINOPHIL # BLD AUTO: 0.25 X10(3)/MCL (ref 0–0.9)
EOSINOPHIL NFR BLD AUTO: 6.9 %
ERYTHROCYTE [DISTWIDTH] IN BLOOD BY AUTOMATED COUNT: 12.5 % (ref 11.5–17)
ERYTHROCYTE [SEDIMENTATION RATE] IN BLOOD: 35 MM/HR (ref 0–20)
EST. AVERAGE GLUCOSE BLD GHB EST-MCNC: 108.3 MG/DL
FOLATE SERPL-MCNC: 10.8 NG/ML (ref 7–31.4)
HBA1C MFR BLD: 5.4 %
HCT VFR BLD AUTO: 40.4 % (ref 37–47)
HGB BLD-MCNC: 12.7 GM/DL (ref 12–16)
IMM GRANULOCYTES # BLD AUTO: 0.01 X10(3)/MCL (ref 0–0.04)
IMM GRANULOCYTES NFR BLD AUTO: 0.3 %
LYMPHOCYTES # BLD AUTO: 2.04 X10(3)/MCL (ref 0.6–4.6)
LYMPHOCYTES NFR BLD AUTO: 56 %
MCH RBC QN AUTO: 29.5 PG (ref 27–31)
MCHC RBC AUTO-ENTMCNC: 31.4 MG/DL (ref 33–36)
MCV RBC AUTO: 94 FL (ref 80–94)
MONOCYTES # BLD AUTO: 0.43 X10(3)/MCL (ref 0.1–1.3)
MONOCYTES NFR BLD AUTO: 11.8 %
NEUTROPHILS # BLD AUTO: 0.9 X10(3)/MCL (ref 2.1–9.2)
NEUTROPHILS NFR BLD AUTO: 23.9 %
NRBC BLD AUTO-RTO: 0 %
PLATELET # BLD AUTO: 190 X10(3)/MCL (ref 130–400)
PMV BLD AUTO: 9.8 FL (ref 7.4–10.4)
RBC # BLD AUTO: 4.3 X10(6)/MCL (ref 4.2–5.4)
VIT B12 SERPL-MCNC: 781 PG/ML (ref 213–816)
WBC # SPEC AUTO: 3.6 X10(3)/MCL (ref 4.5–11.5)

## 2022-10-17 PROCEDURE — 82746 ASSAY OF FOLIC ACID SERUM: CPT

## 2022-10-17 PROCEDURE — 83036 HEMOGLOBIN GLYCOSYLATED A1C: CPT

## 2022-10-17 PROCEDURE — 85651 RBC SED RATE NONAUTOMATED: CPT

## 2022-10-17 PROCEDURE — 85060 BLOOD SMEAR INTERPRETATION: CPT

## 2022-10-17 PROCEDURE — 36415 COLL VENOUS BLD VENIPUNCTURE: CPT

## 2022-10-17 PROCEDURE — 85025 COMPLETE CBC W/AUTO DIFF WBC: CPT

## 2022-10-17 PROCEDURE — 82607 VITAMIN B-12: CPT

## 2022-10-19 LAB — HEMATOLOGIST REVIEW: NORMAL

## 2022-11-01 ENCOUNTER — OFFICE VISIT (OUTPATIENT)
Dept: INTERNAL MEDICINE | Facility: CLINIC | Age: 57
End: 2022-11-01
Payer: MEDICAID

## 2022-11-01 DIAGNOSIS — Z13.29 SCREENING FOR THYROID DISORDER: ICD-10-CM

## 2022-11-01 DIAGNOSIS — J30.1 SEASONAL ALLERGIC RHINITIS DUE TO POLLEN: ICD-10-CM

## 2022-11-01 DIAGNOSIS — E11.9 TYPE 2 DIABETES MELLITUS WITHOUT COMPLICATION, WITHOUT LONG-TERM CURRENT USE OF INSULIN: ICD-10-CM

## 2022-11-01 DIAGNOSIS — K21.9 GASTROESOPHAGEAL REFLUX DISEASE WITHOUT ESOPHAGITIS: ICD-10-CM

## 2022-11-01 DIAGNOSIS — D70.9 NEUTROPENIA, UNSPECIFIED TYPE: ICD-10-CM

## 2022-11-01 DIAGNOSIS — L84 CORNS AND CALLUS: Primary | ICD-10-CM

## 2022-11-01 PROCEDURE — 99214 PR OFFICE/OUTPT VISIT, EST, LEVL IV, 30-39 MIN: ICD-10-PCS | Mod: 95,,, | Performed by: NURSE PRACTITIONER

## 2022-11-01 PROCEDURE — 1159F PR MEDICATION LIST DOCUMENTED IN MEDICAL RECORD: ICD-10-PCS | Mod: CPTII,95,, | Performed by: NURSE PRACTITIONER

## 2022-11-01 PROCEDURE — 1160F PR REVIEW ALL MEDS BY PRESCRIBER/CLIN PHARMACIST DOCUMENTED: ICD-10-PCS | Mod: CPTII,95,, | Performed by: NURSE PRACTITIONER

## 2022-11-01 PROCEDURE — 1159F MED LIST DOCD IN RCRD: CPT | Mod: CPTII,95,, | Performed by: NURSE PRACTITIONER

## 2022-11-01 PROCEDURE — 99214 OFFICE O/P EST MOD 30 MIN: CPT | Mod: 95,,, | Performed by: NURSE PRACTITIONER

## 2022-11-01 PROCEDURE — 1160F RVW MEDS BY RX/DR IN RCRD: CPT | Mod: CPTII,95,, | Performed by: NURSE PRACTITIONER

## 2022-11-01 RX ORDER — CETIRIZINE HYDROCHLORIDE 10 MG/1
10 TABLET ORAL DAILY PRN
Qty: 30 TABLET | Refills: 2 | Status: SHIPPED | OUTPATIENT
Start: 2022-11-01 | End: 2022-11-01 | Stop reason: SDUPTHER

## 2022-11-01 RX ORDER — FLUTICASONE PROPIONATE 50 MCG
1 SPRAY, SUSPENSION (ML) NASAL DAILY
Qty: 16 G | Refills: 2 | Status: SHIPPED | OUTPATIENT
Start: 2022-11-01 | End: 2022-11-01 | Stop reason: SDUPTHER

## 2022-11-01 RX ORDER — FLUTICASONE PROPIONATE 50 MCG
1 SPRAY, SUSPENSION (ML) NASAL DAILY
Qty: 16 G | Refills: 2 | Status: SHIPPED | OUTPATIENT
Start: 2022-11-01

## 2022-11-01 RX ORDER — CETIRIZINE HYDROCHLORIDE 10 MG/1
10 TABLET ORAL DAILY PRN
Qty: 30 TABLET | Refills: 2 | Status: SHIPPED | OUTPATIENT
Start: 2022-11-01

## 2022-11-01 RX ORDER — OMEPRAZOLE 20 MG/1
20 CAPSULE, DELAYED RELEASE ORAL DAILY PRN
Qty: 30 CAPSULE | Refills: 1 | Status: SHIPPED | OUTPATIENT
Start: 2022-11-01

## 2022-11-01 NOTE — ASSESSMENT & PLAN NOTE
Rx zyrtec and flonase.  Avoid/limit triggers such as pollen, dust, molds, and pet dander.   Avoid smoke, strong chemicals, cleaning products, perfumes/colognes.

## 2022-11-01 NOTE — PROGRESS NOTES
Audio Only Telehealth Visit     The patient location is: at home  The chief complaint leading to consultation is: DM f/u  Visit type: Virtual visit with audio only (telephone)  Total time spent with patient: 28 mins     The reason for the audio only service rather than synchronous audio and video virtual visit was related to technical difficulties or patient preference/necessity.     Each patient to whom I provide medical services by telemedicine is:  (1) informed of the relationship between the physician and patient and the respective role of any other health care provider with respect to management of the patient; and (2) notified that they may decline to receive medical services by telemedicine and may withdraw from such care at any time. Patient verbally consented to receive this service via voice-only telephone call.    Billing Provider: LAURA Luna  Level of Service: HI OFFICE/OUTPT VISIT, EST, LEVL IV, 30-39 MIN  Patient PCP Information       Provider PCP Type    LAURA Luna General            Reason for Visit / Chief Complaint: Follow-up and TM-Audio Lab results & refills       History of Present Illness / Problem Focused Workflow     Barbara Diaz is a 57 y.o. Black or  female for DM f/u. PMH DM and obesity. Pt held metformin since last visit and has been following ADA diet. Reports other med compliance. Requesting refill on flonase and zyrtec as her allergies act up around this time annually. Reports some nasal congestion and PND. Labs reviewed with pt. Denies chest pain, shortness of breath, cough, fever, headache, dizziness, weakness, abdominal pain, nausea, vomiting, diarrhea, constipation, dysuria, depression, anxiety, SI/HI.    Review of Systems     Review of Systems   Constitutional: Negative.    HENT:  Positive for nasal congestion and postnasal drip.    Eyes: Negative.    Respiratory: Negative.     Cardiovascular: Negative.    Gastrointestinal: Negative.     Endocrine: Negative.    Genitourinary: Negative.    Musculoskeletal: Negative.    Integumentary:  Negative.   Neurological: Negative.    Psychiatric/Behavioral: Negative.        Medical / Social / Family History     Past Medical History:   Diagnosis Date    Allergy     Diabetes mellitus, type 2     GERD (gastroesophageal reflux disease)        Past Surgical History:   Procedure Laterality Date    TUBAL LIGATION         Social History  Ms. Diaz reports that she has never smoked. She has never used smokeless tobacco. She reports current alcohol use. She reports that she does not use drugs.    Family History  's family history includes Cancer in her mother; Cerebral aneurysm in her brother; Diabetes type I in her brother and sister; Heart disease in her mother; Hypertension in her brother; Prostate cancer in her father.    Medications and Allergies     Medications  Medication List with Changes/Refills   New Medications    CETIRIZINE (ZYRTEC) 10 MG TABLET    Take 1 tablet (10 mg total) by mouth daily as needed for Allergies.    FLUTICASONE PROPIONATE (FLONASE) 50 MCG/ACTUATION NASAL SPRAY    1 spray (50 mcg total) by Each Nostril route once daily.   Changed and/or Refilled Medications    Modified Medication Previous Medication    OMEPRAZOLE (PRILOSEC) 20 MG CAPSULE omeprazole (PRILOSEC) 20 MG capsule       Take 1 capsule (20 mg total) by mouth daily as needed (acid reflux).    Take 1 capsule (20 mg total) by mouth once daily.   Discontinued Medications    LISINOPRIL (PRINIVIL,ZESTRIL) 2.5 MG TABLET    Take 1 tablet (2.5 mg total) by mouth once daily.    METFORMIN (GLUCOPHAGE-XR) 500 MG ER 24HR TABLET    Take 500 mg by mouth.    PRAVASTATIN (PRAVACHOL) 10 MG TABLET    Take 1 tablet (10 mg total) by mouth every evening.         Allergies  Review of patient's allergies indicates:   Allergen Reactions    Codeine        Physical Examination    ]    Physical Exam  Neurological:      Mental Status: She is  alert and oriented to person, place, and time.   Psychiatric:         Mood and Affect: Mood normal.         Speech: Speech normal.         Behavior: Behavior normal. Behavior is cooperative.         Thought Content: Thought content normal.         Judgment: Judgment normal.       Results     Lab Results   Component Value Date    WBC 3.6 (L) 10/17/2022    RBC 4.30 10/17/2022    HGB 12.7 10/17/2022    HCT 40.4 10/17/2022    MCV 94.0 10/17/2022    MCH 29.5 10/17/2022    MCHC 31.4 (L) 10/17/2022    RDW 12.5 10/17/2022     10/17/2022    MPV 9.8 10/17/2022     Sodium Level   Date Value Ref Range Status   06/30/2022 138 136 - 145 mmol/L Final     Potassium Level   Date Value Ref Range Status   06/30/2022 3.9 3.5 - 5.1 mmol/L Final     Carbon Dioxide   Date Value Ref Range Status   06/30/2022 26 22 - 29 mmol/L Final     Blood Urea Nitrogen   Date Value Ref Range Status   06/30/2022 10.0 9.8 - 20.1 mg/dL Final     Creatinine   Date Value Ref Range Status   06/30/2022 0.80 0.55 - 1.02 mg/dL Final     Calcium Level Total   Date Value Ref Range Status   06/30/2022 9.4 8.4 - 10.2 mg/dL Final     Albumin Level   Date Value Ref Range Status   06/30/2022 3.5 3.5 - 5.0 gm/dL Final     Bilirubin Total   Date Value Ref Range Status   06/30/2022 0.4 <=1.5 mg/dL Final     Alkaline Phosphatase   Date Value Ref Range Status   06/30/2022 121 40 - 150 unit/L Final     Aspartate Aminotransferase   Date Value Ref Range Status   06/30/2022 13 5 - 34 unit/L Final     Alanine Aminotransferase   Date Value Ref Range Status   06/30/2022 11 0 - 55 unit/L Final     Estimated GFR-Non    Date Value Ref Range Status   10/04/2021 83 (L) >>=90 mL/min/1.73 m2 Final     Lab Results   Component Value Date    CHOL 177 10/04/2021     Lab Results   Component Value Date    HDL 52 10/04/2021     No results found for: LDLCALC  Lab Results   Component Value Date    TRIG 66 10/04/2021     No results found for: CHOLHDL  Lab Results   Component  Value Date    TSH 1.2610 10/04/2021     Lab Results   Component Value Date    PHUR 5.5 10/04/2021    PROTEINUA Negative 10/04/2021    GLUCUA Negative 10/04/2021    KETONESU Negative 10/04/2021    OCCULTUA Negative 10/04/2021    NITRITE Negative 10/04/2021    LEUKOCYTESUR Negative 10/04/2021     Lab Results   Component Value Date    HGBA1C 5.4 10/17/2022    HGBA1C 5.6 06/30/2022    HGBA1C 5.7 10/04/2021     Path Review, Peripheral Smear  Order: [id number]  Status: Final result     Visible to patient: Yes (not seen)     Next appt: None     Dx: Neutropenia, unspecified type     0 Result Notes  Component 2 wk ago    Peripheral Smear Evaluation  RBCs show normochromasia, normocytosis, hyperchromasia.  WBC morphology and platelet morphology are within normal limits.  No dysplasia is identified.  No blasts are identified.  Clinically correlate.  ADELFO Khan MD   Resulting Agency [facility]              Specimen Collected: 10/17/22 06:54 Last Resulted: 10/19/22 08:36                  Assessment and Plan (including Health Maintenance)     Plan: RTC 3 months and prn. Labs one week prior to appt.         Health Maintenance Due   Topic Date Due    Hepatitis C Screening  Never done    Cervical Cancer Screening  Never done    COVID-19 Vaccine (1) Never done    Pneumococcal Vaccines (Age 0-64) (1 - PCV) Never done    Eye Exam  Never done    HIV Screening  Never done    TETANUS VACCINE  Never done    Low Dose Statin  Never done    Colorectal Cancer Screening  Never done    Shingles Vaccine (1 of 2) Never done    Influenza Vaccine (1) Never done    Lipid Panel  10/04/2022    Diabetes Urine Screening  10/04/2022    Mammogram  12/09/2022       Problem List Items Addressed This Visit          ENT    Seasonal allergic rhinitis due to pollen    Current Assessment & Plan     Rx zyrtec and flonase.  Avoid/limit triggers such as pollen, dust, molds, and pet dander.   Avoid smoke, strong chemicals, cleaning products, perfumes/colognes.                 Derm    Corns and callus - Primary    Current Assessment & Plan     Referral to wound clinic for diabetic care.  Keep appt when scheduled.  Lukewarm epsom salt foot soaks then use pumice stone to remove softened skin.  Dry feet throughly and in between toes.  Keep feet moisturized.  Dry feet and change socks when wet.  Use callus/corn pads as directed on label.           Relevant Orders    Ambulatory referral/consult to Wound Clinic       Oncology    Neutropenia    Current Assessment & Plan     Peripheral smear unremarkable.  Will continue to monitor.            Endocrine    Diabetes mellitus    Current Assessment & Plan     A1C 5.4 without meds.  D/C metformin.  Hold lisinopril and pravastatin.  ADA diet and aerobic exercise encouraged.  Check feet daily.         Relevant Orders    Ambulatory referral/consult to Wound Clinic    Comprehensive Metabolic Panel    Hemoglobin A1C    Lipid Panel    Urinalysis, Reflex to Urine Culture Urine, Clean Catch    Microalbumin/Creatinine Ratio, Urine       GI    Gastroesophageal reflux disease    Current Assessment & Plan     Continue omeprazole prn.  Avoid spicy, acidic, fried food and alcohol.   Eat 2-3 hours before bed.  Avoid tight fitting clothes and chew food thoroughly.   Reduce caffeine intake, avoid soda.   Take meds as prescribed.             Other Visit Diagnoses       Screening for thyroid disorder        Relevant Orders    TSH    T4, Free            Health Maintenance Topics with due status: Not Due       Topic Last Completion Date    Foot Exam 07/12/2022    Hemoglobin A1c 10/17/2022       Future Appointments   Date Time Provider Department Center   2/1/2023  8:15 AM [facility]             Signature:  LAURA Luna  [facility]  [address] [facility] [address]    Date of encounter: 11/1/22              This service was not originating from a related E/M  service provided within the previous 7 days nor will  to an E/M service or procedure within the next 24 hours or my soonest available appointment.  Prevailing standard of care was able to be met in this audio-only visit.

## 2022-11-01 NOTE — ASSESSMENT & PLAN NOTE
Referral to wound clinic for diabetic care.  Keep appt when scheduled.  Lukewarm epsom salt foot soaks then use pumice stone to remove softened skin.  Dry feet throughly and in between toes.  Keep feet moisturized.  Dry feet and change socks when wet.  Use callus/corn pads as directed on label.

## 2022-11-01 NOTE — ASSESSMENT & PLAN NOTE
A1C 5.4 without meds.  D/C metformin.  Hold lisinopril and pravastatin.  ADA diet and aerobic exercise encouraged.  Check feet daily.

## 2022-11-08 ENCOUNTER — HOSPITAL ENCOUNTER (OUTPATIENT)
Dept: WOUND CARE | Facility: HOSPITAL | Age: 57
Discharge: HOME | End: 2022-11-08
Attending: NURSE PRACTITIONER
Payer: MEDICAID

## 2022-11-08 VITALS — SYSTOLIC BLOOD PRESSURE: 121 MMHG | HEART RATE: 75 BPM | DIASTOLIC BLOOD PRESSURE: 80 MMHG | RESPIRATION RATE: 20 BRPM

## 2022-11-08 DIAGNOSIS — L84 CORNS AND CALLUS: ICD-10-CM

## 2022-11-08 DIAGNOSIS — E11.9 TYPE 2 DIABETES MELLITUS WITHOUT COMPLICATION, WITHOUT LONG-TERM CURRENT USE OF INSULIN: ICD-10-CM

## 2022-11-08 DIAGNOSIS — L60.2 OVERGROWN TOENAILS: Primary | ICD-10-CM

## 2022-11-08 DIAGNOSIS — M21.962 DEFORMITY OF BOTH FEET: ICD-10-CM

## 2022-11-08 DIAGNOSIS — M21.961 DEFORMITY OF BOTH FEET: ICD-10-CM

## 2022-11-08 PROCEDURE — 11056 PARNG/CUTG B9 HYPRKR LES 2-4: CPT | Mod: ,,, | Performed by: NURSE PRACTITIONER

## 2022-11-08 PROCEDURE — 99202 OFFICE O/P NEW SF 15 MIN: CPT | Mod: 25,,, | Performed by: NURSE PRACTITIONER

## 2022-11-08 PROCEDURE — 99202 PR OFFICE/OUTPT VISIT, NEW, LEVL II, 15-29 MIN: ICD-10-PCS | Mod: 25,,, | Performed by: NURSE PRACTITIONER

## 2022-11-08 PROCEDURE — 11719 TRIM NAIL(S) ANY NUMBER: CPT

## 2022-11-08 PROCEDURE — 11719 PR TRIM NAIL(S): ICD-10-PCS | Mod: 59,,, | Performed by: NURSE PRACTITIONER

## 2022-11-08 PROCEDURE — 11056 PR TRIM BENIGN HYPERKERATOTIC SKIN LESION,2-4: ICD-10-PCS | Mod: ,,, | Performed by: NURSE PRACTITIONER

## 2022-11-08 PROCEDURE — 99211 OFF/OP EST MAY X REQ PHY/QHP: CPT

## 2022-11-08 PROCEDURE — 11719 TRIM NAIL(S) ANY NUMBER: CPT | Mod: 59,,, | Performed by: NURSE PRACTITIONER

## 2022-11-08 PROCEDURE — 11056 PARNG/CUTG B9 HYPRKR LES 2-4: CPT

## 2022-11-09 PROBLEM — L60.2 OVERGROWN TOENAILS: Status: ACTIVE | Noted: 2022-11-09

## 2022-11-09 PROBLEM — M21.961 DEFORMITY OF BOTH FEET: Status: ACTIVE | Noted: 2022-11-09

## 2022-11-09 PROBLEM — M21.962 DEFORMITY OF BOTH FEET: Status: ACTIVE | Noted: 2022-11-09

## 2022-11-09 NOTE — PROGRESS NOTES
Subjective:       Patient ID: Barbara Diaz is a 57 y.o. female.    Chief Complaint: [facility]    57-year-old female presents to wound care clinic as referral from her primary care doctor for diabetic foot care.  Medical history:  Diabetes, GERD, neutrophil tinea, and seasonal allergies.     Today's visit 11/8/22:  Trimmed all 10 toenails using podiatry clippers, and filed with [facility] board.  Bilateral feet bunions noted.  Pared 5 calluses bilateral 5th toe and left great metatarsal using # 4 Dermmel curette.  Tolerated well.  Discussion regarding diabetic shoes or diabetic inserts informed will send message to primary care doctor for prescription. Instructed on Diabetic foot care.  Instructed may use Vaseline to moisturize bilateral feet with the exception between toes.  Will have her return in one month. Instructed to call office with any question, concerns, or new skin issues. Verbalized understanding of all teachings.     Lab Results   Component Value Date/Time    WBC 3.6 (L) 10/17/2022 06:54 AM    RBC 4.30 10/17/2022 06:54 AM    HGB 12.7 10/17/2022 06:54 AM    HCT 40.4 10/17/2022 06:54 AM    MCV 94.0 10/17/2022 06:54 AM    MCH 29.5 10/17/2022 06:54 AM    CREATININE 0.80 06/30/2022 06:56 AM    HGBA1C 5.4 10/17/2022 06:54 AM     Review of Systems   All other systems reviewed and are negative.        Objective:        Physical Exam  Vitals reviewed.   Cardiovascular:      Rate and Rhythm: Normal rate.      Pulses:           Dorsalis pedis pulses are detected w/ Doppler on the right side and detected w/ Doppler on the left side.        Posterior tibial pulses are detected w/ Doppler on the right side and detected w/ Doppler on the left side.   Pulmonary:      Effort: Pulmonary effort is normal.   Musculoskeletal:      Right foot: Bunion present.      Left foot: Bunion present.        Feet:    Feet:      Right foot:      Skin integrity: Dry skin present.      Toenail Condition: Right toenails are long.      Left foot:       Skin integrity: Dry skin present.      Toenail Condition: Left toenails are long.      Comments: Monofilament test done sensation in all areas.  Skin:     General: Skin is warm and dry.      Capillary Refill: Capillary refill takes less than 2 seconds.   Neurological:      Mental Status: She is alert.                  Assessment:         ICD-10-CM ICD-9-CM   1. Overgrown toenails  L60.2 703.8   2. Corns and callus  L84 700   3. Deformity of both feet  M21.961 736.70    M21.962    4. Type 2 diabetes mellitus without complication, without long-term current use of insulin  E11.9 250.00         Plan:   Tissue pathology and/or culture taken:  [] Yes [x] No   Sharp debridement performed:   [] Yes [x] No   Labs ordered this visit:   [] Yes [x] No   Imaging ordered this visit:   [] Yes [x] No             1. Overgrown toenails     Trimmed tolerated well.   2. Corns and callus     Pared all calluses tolerated well.   3.  Deformity of both feet    Requested script for DM shoes or inserts from PCP.    4. Type 2 diabetes mellitus without complication, without long-term current use of insulin     Co-factor in development of ulcers.  Instructed to check feet daily.  Last A1c 5.4.        Follow up in about 3 months (around 2/8/2023).

## 2022-11-21 ENCOUNTER — TELEPHONE (OUTPATIENT)
Dept: INTERNAL MEDICINE | Facility: CLINIC | Age: 57
End: 2022-11-21
Payer: MEDICAID

## 2022-11-22 NOTE — TELEPHONE ENCOUNTER
Contact the pt to schedule an appt to document/provide rx for DM shoe inserts. It can be within the next 1-2 months. Thanks

## 2022-11-22 NOTE — TELEPHONE ENCOUNTER
----- Message from LAURA Dumont sent at 11/9/2022 11:35 AM CST -----  Regarding: DM shoes or inserts  Good morning,  I saw Mrs. Diaz yesterday. Do to her foot deformity because of her Bunions she could benefit from DM shoes or DM inserts, Could you please provide a prescription?     Thank you, LAURA Ochoa.

## 2023-01-30 ENCOUNTER — LAB VISIT (OUTPATIENT)
Dept: LAB | Facility: HOSPITAL | Age: 58
End: 2023-01-30
Attending: NURSE PRACTITIONER
Payer: COMMERCIAL

## 2023-01-30 DIAGNOSIS — E11.9 TYPE 2 DIABETES MELLITUS WITHOUT COMPLICATION, WITHOUT LONG-TERM CURRENT USE OF INSULIN: ICD-10-CM

## 2023-01-30 LAB
APPEARANCE UR: CLEAR
BACTERIA #/AREA URNS AUTO: ABNORMAL /HPF
BILIRUB UR QL STRIP.AUTO: NEGATIVE MG/DL
COLOR UR AUTO: ABNORMAL
CREAT UR-MCNC: 142.9 MG/DL (ref 47–110)
GLUCOSE UR QL STRIP.AUTO: NORMAL MG/DL
HYALINE CASTS #/AREA URNS LPF: ABNORMAL /LPF
KETONES UR QL STRIP.AUTO: NEGATIVE MG/DL
LEUKOCYTE ESTERASE UR QL STRIP.AUTO: NEGATIVE UNIT/L
MICROALBUMIN UR-MCNC: 16.7 UG/ML
MICROALBUMIN/CREAT RATIO PNL UR: 11.7 MG/GM CR (ref 0–30)
MUCOUS THREADS URNS QL MICRO: ABNORMAL /LPF
NITRITE UR QL STRIP.AUTO: NEGATIVE
PH UR STRIP.AUTO: 5.5 [PH]
PROT UR QL STRIP.AUTO: NEGATIVE MG/DL
RBC #/AREA URNS AUTO: ABNORMAL /HPF
RBC UR QL AUTO: NEGATIVE UNIT/L
SP GR UR STRIP.AUTO: 1.02
SQUAMOUS #/AREA URNS LPF: ABNORMAL /HPF
UROBILINOGEN UR STRIP-ACNC: NORMAL MG/DL
WBC #/AREA URNS AUTO: ABNORMAL /HPF

## 2023-01-30 PROCEDURE — 81001 URINALYSIS AUTO W/SCOPE: CPT

## 2023-01-30 PROCEDURE — 82043 UR ALBUMIN QUANTITATIVE: CPT

## 2023-02-07 ENCOUNTER — HOSPITAL ENCOUNTER (OUTPATIENT)
Dept: WOUND CARE | Facility: HOSPITAL | Age: 58
Discharge: HOME | End: 2023-02-07
Attending: NURSE PRACTITIONER
Payer: MEDICAID

## 2023-02-07 DIAGNOSIS — E11.9 TYPE 2 DIABETES MELLITUS WITHOUT COMPLICATION, WITHOUT LONG-TERM CURRENT USE OF INSULIN: ICD-10-CM

## 2023-02-07 DIAGNOSIS — M21.962 DEFORMITY OF BOTH FEET: ICD-10-CM

## 2023-02-07 DIAGNOSIS — L60.2 OVERGROWN TOENAILS: Primary | ICD-10-CM

## 2023-02-07 DIAGNOSIS — L84 CORNS AND CALLUS: ICD-10-CM

## 2023-02-07 DIAGNOSIS — M21.612 BILATERAL BUNIONS: ICD-10-CM

## 2023-02-07 DIAGNOSIS — M21.961 DEFORMITY OF BOTH FEET: ICD-10-CM

## 2023-02-07 DIAGNOSIS — M20.41 HAMMER TOES, BILATERAL: ICD-10-CM

## 2023-02-07 DIAGNOSIS — M20.42 HAMMER TOES, BILATERAL: ICD-10-CM

## 2023-02-07 DIAGNOSIS — M21.611 BILATERAL BUNIONS: ICD-10-CM

## 2023-02-07 PROCEDURE — 11056 PARNG/CUTG B9 HYPRKR LES 2-4: CPT | Mod: ,,, | Performed by: NURSE PRACTITIONER

## 2023-02-07 PROCEDURE — 99499 NO LOS: ICD-10-PCS | Mod: ,,, | Performed by: NURSE PRACTITIONER

## 2023-02-07 PROCEDURE — 11719 PR TRIM NAIL(S): ICD-10-PCS | Mod: 51,,, | Performed by: NURSE PRACTITIONER

## 2023-02-07 PROCEDURE — 11056 PR TRIM BENIGN HYPERKERATOTIC SKIN LESION,2-4: ICD-10-PCS | Mod: ,,, | Performed by: NURSE PRACTITIONER

## 2023-02-07 PROCEDURE — 11719 TRIM NAIL(S) ANY NUMBER: CPT

## 2023-02-07 PROCEDURE — 99499 UNLISTED E&M SERVICE: CPT | Mod: ,,, | Performed by: NURSE PRACTITIONER

## 2023-02-07 PROCEDURE — 11719 TRIM NAIL(S) ANY NUMBER: CPT | Mod: 51,,, | Performed by: NURSE PRACTITIONER

## 2023-02-07 PROCEDURE — 11721 DEBRIDE NAIL 6 OR MORE: CPT | Mod: 59

## 2023-02-07 PROCEDURE — 11056 PARNG/CUTG B9 HYPRKR LES 2-4: CPT

## 2023-02-07 NOTE — PROGRESS NOTES
Subjective:       Patient ID: Barbara Diaz is a 57 y.o. female.    Chief Complaint: C    57-year-old female presents to wound care clinic  diabetic foot care and callus removal.  Medical history:  Diabetes, bilateral bunions going to callus, seasonal allergies, hammer toe, and GERD. PCP LAURA Kelly  last appt 11/1/22.     Today's visit 02/07/2023:  Trimmed all 10 toenails using podiatry clippers, and filed with [facility].  Pared calluses using # 7 Dermal curette right foot 5th toe lateral x2 and left great toe.  Monofilament  test done minimal sensation in all areas.  Instructed on diabetic foot care, proper footwear, and provided hand out regarding diabetic feet.  Will have her return in 3 months.  Instructed to call the office with any question, concerns, or new skin issues.     Lab Results   Component Value Date/Time    WBC 3.6 (L) 10/17/2022 06:54 AM    RBC 4.30 10/17/2022 06:54 AM    HGB 12.7 10/17/2022 06:54 AM    HCT 40.4 10/17/2022 06:54 AM    MCV 94.0 10/17/2022 06:54 AM    MCH 29.5 10/17/2022 06:54 AM    CREATININE 0.81 01/30/2023 06:54 AM    HGBA1C 5.5 01/30/2023 06:54 AM     Review of Systems   All other systems reviewed and are negative.        Objective:        Physical Exam  Cardiovascular:      Pulses:           Dorsalis pedis pulses are 2+ on the right side and 2+ on the left side.        Posterior tibial pulses are 2+ on the right side and 2+ on the left side.   Musculoskeletal:        Feet:    Feet:      Right foot:      Skin integrity: Callus present.      Toenail Condition: Right toenails are long.      Left foot:      Skin integrity: Callus present.      Toenail Condition: Left toenails are long.      Comments: Monofilament test done decrease sensation in all areas.  Skin:     General: Skin is warm and dry.      Capillary Refill: Capillary refill takes less than 2 seconds.            Assessment:         ICD-10-CM ICD-9-CM   1. Overgrown toenails  L60.2 703.8   2. Type 2  diabetes mellitus without complication, without long-term current use of insulin  E11.9 250.00   3. Corns and callus  L84 700   4. Deformity of both feet  M21.961 736.70    M21.962    5. Hammer toes, bilateral  M20.41 735.4    M20.42    6. Bilateral bunions  M21.611 727.1    M21.612          Plan:   Tissue pathology and/or culture taken:  [] Yes [x] No   Sharp debridement performed:   [] Yes [x] No   Labs ordered this visit:   [] Yes [x] No   Imaging ordered this visit:   [] Yes [x] No         1. Overgrown toenails     Trimmed. Instructed on nail care.    2. Type 2 diabetes mellitus without complication, without long-term current use of insulin     Co-factor in development of ulcers and delayed healing. Last A1C 5.5   3. Corns and callus     Pared tolerated well. Instructed on proper foot wear.    4. Deformity of both feet     Instructed on proper foot wear due to foot deformity. Instructed to purchase wider shoes.       5. Hammer toes, bilateral     Instruction on foot wear and apply corn pad to top of toes to protect from shoe.       6. Bilateral bunions     Instructed of monitoring area and informed will need Podiatry if want to correct bunions.                  No follow-ups on file.

## 2023-02-08 ENCOUNTER — PATIENT MESSAGE (OUTPATIENT)
Dept: ADMINISTRATIVE | Facility: HOSPITAL | Age: 58
End: 2023-02-08
Payer: COMMERCIAL

## 2023-02-15 ENCOUNTER — OFFICE VISIT (OUTPATIENT)
Dept: INTERNAL MEDICINE | Facility: CLINIC | Age: 58
End: 2023-02-15
Payer: MEDICAID

## 2023-02-15 VITALS — HEIGHT: 72 IN | BODY MASS INDEX: 29.19 KG/M2

## 2023-02-15 DIAGNOSIS — Z12.31 ENCOUNTER FOR SCREENING MAMMOGRAM FOR MALIGNANT NEOPLASM OF BREAST: ICD-10-CM

## 2023-02-15 DIAGNOSIS — Z12.11 SCREENING FOR MALIGNANT NEOPLASM OF COLON: ICD-10-CM

## 2023-02-15 DIAGNOSIS — Z78.0 POST-MENOPAUSAL: ICD-10-CM

## 2023-02-15 DIAGNOSIS — K21.9 GASTROESOPHAGEAL REFLUX DISEASE WITHOUT ESOPHAGITIS: ICD-10-CM

## 2023-02-15 DIAGNOSIS — J30.1 SEASONAL ALLERGIC RHINITIS DUE TO POLLEN: Primary | ICD-10-CM

## 2023-02-15 DIAGNOSIS — Z00.00 WELLNESS EXAMINATION: ICD-10-CM

## 2023-02-15 DIAGNOSIS — E11.628 TYPE 2 DIABETES MELLITUS WITH OTHER SKIN COMPLICATION, WITHOUT LONG-TERM CURRENT USE OF INSULIN: Chronic | ICD-10-CM

## 2023-02-15 PROBLEM — E11.9 DIABETES MELLITUS: Chronic | Status: ACTIVE | Noted: 2022-07-12

## 2023-02-15 PROBLEM — M20.42 HAMMER TOES, BILATERAL: Chronic | Status: ACTIVE | Noted: 2023-02-07

## 2023-02-15 PROBLEM — L84 CORNS AND CALLUS: Chronic | Status: ACTIVE | Noted: 2022-07-12

## 2023-02-15 PROBLEM — M21.612 BILATERAL BUNIONS: Chronic | Status: ACTIVE | Noted: 2022-07-12

## 2023-02-15 PROBLEM — M20.41 HAMMER TOES, BILATERAL: Chronic | Status: ACTIVE | Noted: 2023-02-07

## 2023-02-15 PROBLEM — M21.611 BILATERAL BUNIONS: Chronic | Status: ACTIVE | Noted: 2022-07-12

## 2023-02-15 PROCEDURE — 3061F NEG MICROALBUMINURIA REV: CPT | Mod: CPTII,95,, | Performed by: NURSE PRACTITIONER

## 2023-02-15 PROCEDURE — 3061F PR NEG MICROALBUMINURIA RESULT DOCUMENTED/REVIEW: ICD-10-PCS | Mod: CPTII,95,, | Performed by: NURSE PRACTITIONER

## 2023-02-15 PROCEDURE — 1160F RVW MEDS BY RX/DR IN RCRD: CPT | Mod: CPTII,95,, | Performed by: NURSE PRACTITIONER

## 2023-02-15 PROCEDURE — 3008F BODY MASS INDEX DOCD: CPT | Mod: CPTII,95,, | Performed by: NURSE PRACTITIONER

## 2023-02-15 PROCEDURE — 3066F PR DOCUMENTATION OF TREATMENT FOR NEPHROPATHY: ICD-10-PCS | Mod: CPTII,95,, | Performed by: NURSE PRACTITIONER

## 2023-02-15 PROCEDURE — 99213 OFFICE O/P EST LOW 20 MIN: CPT | Mod: 95,,, | Performed by: NURSE PRACTITIONER

## 2023-02-15 PROCEDURE — 3008F PR BODY MASS INDEX (BMI) DOCUMENTED: ICD-10-PCS | Mod: CPTII,95,, | Performed by: NURSE PRACTITIONER

## 2023-02-15 PROCEDURE — 99213 PR OFFICE/OUTPT VISIT, EST, LEVL III, 20-29 MIN: ICD-10-PCS | Mod: 95,,, | Performed by: NURSE PRACTITIONER

## 2023-02-15 PROCEDURE — 3066F NEPHROPATHY DOC TX: CPT | Mod: CPTII,95,, | Performed by: NURSE PRACTITIONER

## 2023-02-15 PROCEDURE — 1159F PR MEDICATION LIST DOCUMENTED IN MEDICAL RECORD: ICD-10-PCS | Mod: CPTII,95,, | Performed by: NURSE PRACTITIONER

## 2023-02-15 PROCEDURE — 1159F MED LIST DOCD IN RCRD: CPT | Mod: CPTII,95,, | Performed by: NURSE PRACTITIONER

## 2023-02-15 PROCEDURE — 1160F PR REVIEW ALL MEDS BY PRESCRIBER/CLIN PHARMACIST DOCUMENTED: ICD-10-PCS | Mod: CPTII,95,, | Performed by: NURSE PRACTITIONER

## 2023-02-15 NOTE — PROGRESS NOTES
Audio Only Telehealth Visit     The patient location is: at home  The chief complaint leading to consultation is: f/u diabetes  Visit type: Virtual visit with audio only (telephone)  Total time spent with patient: 26 mins     The reason for the audio only service rather than synchronous audio and video virtual visit was related to technical difficulties. Pt was unable to access portal; she could not remember her password and locked out.     Each patient to whom I provide medical services by telemedicine is:  (1) informed of the relationship between the physician and patient and the respective role of any other health care provider with respect to management of the patient; and (2) notified that they may decline to receive medical services by telemedicine and may withdraw from such care at any time. Patient verbally consented to receive this service via voice-only telephone call.    Billing Provider: LAURA Luna  Level of Service:   Patient PCP Information       Provider PCP Type    LAURA Luna General            Reason for Visit / Chief Complaint: Follow-up (Lab results)       History of Present Illness / Problem Focused Workflow     Barbara Diaz is a 57 y.o. Black or  female for DM f/u. PMH DM, bilateral bunions, AR and obesity. Followed by [facility] wound and GYN clinics. Pt's last visit with wound care was 2/7/23 and next visit in May.     Today, pt reports only taking prn meds. Blood sugars remain controlled with diet. Continues to follow ADA diet. Lab results reviewed with pt. Endorses bilateral foot pain but relieved with foot care q3 months. Denies chest pain, shortness of breath, cough, fever, headache, dizziness, weakness, abdominal pain, nausea, vomiting, diarrhea, constipation, dysuria, depression, anxiety, SI/HI.      Review of Systems     Review of Systems     See HPI for details    Constitutional: Denies Change in appetite. Denies Chills. Denies Fever. Denies Night  sweats.  Eye: Denies Blurred vision. Denies Discharge. Denies Eye pain.  ENT: Denies Decreased hearing. Denies Sore throat. Denies Swollen glands.  Respiratory: Denies Cough. Denies Shortness of breath. Denies Shortness of breath with exertion. Denies Wheezing.  Cardiovascular: DeniesChest pain at rest. Denies Chest pain with exertion. Denies Irregular heartbeat. Denies Palpitations. Denies Edema.  Gastrointestinal: Denies Abdominal pain. Denies Diarrhea. Denies Nausea. Denies Vomiting. Denies Hematemesis or Hematochezia.  Genitourinary: Denies Dysuria. Denies Urinary frequency. Denies Urinary urgency. Denies Blood in urine.  Endocrine: Denies Cold intolerance. Denies Excessive thirst. Denies Heat intolerance. Denies Weight loss. Denies Weight gain.  Musculoskeletal: Denies Painful joints. Denies Weakness.  Admits Foot pain.  Integumentary: Denies Rash. Denies Itching. Denies Dry skin.  Neurologic: Denies Dizziness. Denies Fainting. Denies Headache.  Psychiatric: Denies Depression. Denies Anxiety. Denies Suicidal/Homicidal ideations.    All Other ROS: Negative except as stated in HPI.     Medical / Social / Family History     ----------------------------  Allergy  Diabetes mellitus, type 2  GERD (gastroesophageal reflux disease)     Past Surgical History:   Procedure Laterality Date    TUBAL LIGATION         Social History     Socioeconomic History    Marital status:    Occupational History    Occupation: unemployed   Tobacco Use    Smoking status: Never    Smokeless tobacco: Never   Substance and Sexual Activity    Alcohol use: Yes     Comment: wine 1-2 times a month    Drug use: Never    Sexual activity: Yes     Social Determinants of Health     Social Connections: Moderately Isolated    Frequency of Communication with Friends and Family: More than three times a week    Frequency of Social Gatherings with Friends and Family: More than three times a week    Attends Jain Services: 1 to 4 times per year  "   Active Member of Clubs or Organizations: No    Attends Club or Organization Meetings: Never    Marital Status: Never    Housing Stability: Low Risk     Unable to Pay for Housing in the Last Year: No    Number of Places Lived in the Last Year: 1    Unstable Housing in the Last Year: No        Family History   Problem Relation Age of Onset    Cancer Mother     Heart disease Mother     Prostate cancer Father     Diabetes type I Sister     Cerebral aneurysm Brother     Diabetes type I Brother     Hypertension Brother         Medications and Allergies     Medications  Current Outpatient Medications   Medication Instructions    cetirizine (ZYRTEC) 10 mg, Oral, Daily PRN    fluticasone propionate (FLONASE) 50 mcg, Each Nostril, Daily    omeprazole (PRILOSEC) 20 mg, Oral, Daily PRN         Allergies  Review of patient's allergies indicates:   Allergen Reactions    Codeine        Physical Examination   Vital Signs  Pain Score: 0-No pain  Height and Weight  Height: 5' 11.65" (182 cm)  Weight in (lb) to have BMI = 25: 182.2]    Physical Exam  Neurological:      Mental Status: She is alert and oriented to person, place, and time.   Psychiatric:         Mood and Affect: Mood normal.         Speech: Speech normal.         Behavior: Behavior normal. Behavior is cooperative.         Thought Content: Thought content normal.         Judgment: Judgment normal.       Results     Lab Results   Component Value Date    WBC 3.6 (L) 10/17/2022    HGB 12.7 10/17/2022    HCT 40.4 10/17/2022     10/17/2022    CHOL 196 01/30/2023    TRIG 66 01/30/2023    ALT 13 01/30/2023    AST 16 01/30/2023     01/30/2023    K 4.2 01/30/2023    CREATININE 0.81 01/30/2023    BUN 12.2 01/30/2023    CO2 25 01/30/2023    TSH 1.001 01/30/2023    HGBA1C 5.5 01/30/2023       Assessment and Plan (including Health Maintenance)     Plan:     1. Type 2 diabetes mellitus with other skin complication, without long-term current use of insulin  A1C " 5.5 at goal. Previous A1C 5.4.   Controlled with diet.  Follow ADA diet.  Avoid soda, simple sweets, and limit rice/pasta/bread/starches and consume brown options when possible.   Maintain healthy weight with BMI goal <30.   Perform aerobic exercise for 150 minutes per week (or 5 days a week for 30 minutes each day).   Examine feet daily.   Obtain annual dilated eye exam.  Eye exam: at next visit  Foot exam: 7/12/22      2. Seasonal allergic rhinitis due to pollen  Continue zyrtec and flonase prn.   Avoid/limit triggers such as pollen, dust, molds, and pet dander.   Avoid smoke, strong chemicals, cleaning products, perfumes/colognes.      3. Gastroesophageal reflux disease without esophagitis  Continue protonix prn.   Avoid spicy, acidic, fried food and alcohol.   Eat 2-3 hours before bed.  Avoid tight fitting clothes and chew food thoroughly.   Reduce caffeine intake, avoid soda.   Take meds as prescribed.         Problem List Items Addressed This Visit          ENT    Seasonal allergic rhinitis due to pollen - Primary (Chronic)       Endocrine    Diabetes mellitus (Chronic)       GI    Gastroesophageal reflux disease         Health Maintenance Due   Topic Date Due    Hepatitis C Screening  Never done    Cervical Cancer Screening  Never done    COVID-19 Vaccine (1) Never done    Pneumococcal Vaccines (Age 0-64) (1 - PCV) Never done    Eye Exam  Never done    HIV Screening  Never done    TETANUS VACCINE  Never done    Low Dose Statin  Never done    Colorectal Cancer Screening  Never done    Shingles Vaccine (1 of 2) Never done    Influenza Vaccine (1) Never done    Mammogram  12/09/2022       Follow up in about 4 weeks (around 3/15/2023) for Diabetic shoe inserts.        Signature:  LAURA Luna  [facility]  [address] [facility] [id number]      Date of encounter: 2/15/23              This service was not originating from a related E/M  service provided within the previous 7 days nor will  to an E/M service or procedure within the next 24 hours or my soonest available appointment.  Prevailing standard of care was able to be met in this audio-only visit.

## 2023-02-27 ENCOUNTER — HOSPITAL ENCOUNTER (OUTPATIENT)
Dept: RADIOLOGY | Facility: HOSPITAL | Age: 58
Discharge: HOME | End: 2023-02-27
Attending: NURSE PRACTITIONER
Payer: MEDICAID

## 2023-02-27 DIAGNOSIS — Z78.0 POST-MENOPAUSAL: ICD-10-CM

## 2023-02-27 DIAGNOSIS — Z12.31 ENCOUNTER FOR SCREENING MAMMOGRAM FOR MALIGNANT NEOPLASM OF BREAST: ICD-10-CM

## 2023-02-27 PROCEDURE — 77063 MAMMO DIGITAL SCREENING BILAT WITH TOMO: ICD-10-PCS | Mod: 26,,, | Performed by: RADIOLOGY

## 2023-02-27 PROCEDURE — 77067 MAMMO DIGITAL SCREENING BILAT WITH TOMO: ICD-10-PCS | Mod: 26,,, | Performed by: RADIOLOGY

## 2023-02-27 PROCEDURE — 77067 SCR MAMMO BI INCL CAD: CPT | Mod: TC

## 2023-02-27 PROCEDURE — 77067 SCR MAMMO BI INCL CAD: CPT | Mod: 26,,, | Performed by: RADIOLOGY

## 2023-02-27 PROCEDURE — 77080 DXA BONE DENSITY AXIAL: CPT | Mod: TC

## 2023-02-27 PROCEDURE — 77063 BREAST TOMOSYNTHESIS BI: CPT | Mod: 26,,, | Performed by: RADIOLOGY

## 2023-03-03 LAB — NONINV COLON CA DNA+OCC BLD SCRN STL QL: NEGATIVE

## 2023-03-17 ENCOUNTER — OFFICE VISIT (OUTPATIENT)
Dept: INTERNAL MEDICINE | Facility: CLINIC | Age: 58
End: 2023-03-17
Payer: MEDICAID

## 2023-03-17 ENCOUNTER — CLINICAL SUPPORT (OUTPATIENT)
Dept: INTERNAL MEDICINE | Facility: CLINIC | Age: 58
End: 2023-03-17
Attending: NURSE PRACTITIONER
Payer: MEDICAID

## 2023-03-17 VITALS
RESPIRATION RATE: 18 BRPM | HEART RATE: 67 BPM | DIASTOLIC BLOOD PRESSURE: 76 MMHG | WEIGHT: 219.38 LBS | BODY MASS INDEX: 29.71 KG/M2 | TEMPERATURE: 98 F | HEIGHT: 72 IN | SYSTOLIC BLOOD PRESSURE: 109 MMHG

## 2023-03-17 DIAGNOSIS — M20.42 HAMMER TOES, BILATERAL: Chronic | ICD-10-CM

## 2023-03-17 DIAGNOSIS — M21.962 DEFORMITY OF BOTH FEET: ICD-10-CM

## 2023-03-17 DIAGNOSIS — Z13.5 SCREENING FOR DIABETIC RETINOPATHY: Primary | ICD-10-CM

## 2023-03-17 DIAGNOSIS — M20.41 HAMMER TOES, BILATERAL: Chronic | ICD-10-CM

## 2023-03-17 DIAGNOSIS — Z13.5 SCREENING FOR DIABETIC RETINOPATHY: ICD-10-CM

## 2023-03-17 DIAGNOSIS — E11.9 TYPE 2 DIABETES MELLITUS WITHOUT COMPLICATION, WITHOUT LONG-TERM CURRENT USE OF INSULIN: Chronic | ICD-10-CM

## 2023-03-17 DIAGNOSIS — M21.612 BILATERAL BUNIONS: Chronic | ICD-10-CM

## 2023-03-17 DIAGNOSIS — Z00.00 WELLNESS EXAMINATION: ICD-10-CM

## 2023-03-17 DIAGNOSIS — E11.69 TYPE 2 DIABETES MELLITUS WITH OTHER SPECIFIED COMPLICATION, WITHOUT LONG-TERM CURRENT USE OF INSULIN: ICD-10-CM

## 2023-03-17 DIAGNOSIS — M21.611 BILATERAL BUNIONS: Chronic | ICD-10-CM

## 2023-03-17 DIAGNOSIS — M65.341 TRIGGER FINGER, RIGHT RING FINGER: ICD-10-CM

## 2023-03-17 DIAGNOSIS — M65.342 TRIGGER RING FINGER OF LEFT HAND: ICD-10-CM

## 2023-03-17 DIAGNOSIS — E66.09 CLASS 1 OBESITY DUE TO EXCESS CALORIES WITHOUT SERIOUS COMORBIDITY WITH BODY MASS INDEX (BMI) OF 30.0 TO 30.9 IN ADULT: Chronic | ICD-10-CM

## 2023-03-17 DIAGNOSIS — M21.961 DEFORMITY OF BOTH FEET: ICD-10-CM

## 2023-03-17 PROBLEM — E66.811 CLASS 1 OBESITY WITH BODY MASS INDEX (BMI) OF 30.0 TO 30.9 IN ADULT: Chronic | Status: ACTIVE | Noted: 2023-03-17

## 2023-03-17 PROBLEM — E66.9 CLASS 1 OBESITY WITH BODY MASS INDEX (BMI) OF 30.0 TO 30.9 IN ADULT: Chronic | Status: ACTIVE | Noted: 2023-03-17

## 2023-03-17 PROCEDURE — 99212 PR OFFICE/OUTPT VISIT, EST, LEVL II, 10-19 MIN: ICD-10-PCS | Mod: 25,S$PBB,, | Performed by: NURSE PRACTITIONER

## 2023-03-17 PROCEDURE — 92228 IMG RTA DETC/MNTR DS PHY/QHP: CPT | Mod: PBBFAC,59

## 2023-03-17 PROCEDURE — 99214 OFFICE O/P EST MOD 30 MIN: CPT | Mod: PBBFAC | Performed by: NURSE PRACTITIONER

## 2023-03-17 PROCEDURE — 3078F DIAST BP <80 MM HG: CPT | Mod: CPTII,,, | Performed by: NURSE PRACTITIONER

## 2023-03-17 PROCEDURE — 99212 OFFICE O/P EST SF 10 MIN: CPT | Mod: 25,S$PBB,, | Performed by: NURSE PRACTITIONER

## 2023-03-17 PROCEDURE — 1160F RVW MEDS BY RX/DR IN RCRD: CPT | Mod: CPTII,,, | Performed by: NURSE PRACTITIONER

## 2023-03-17 PROCEDURE — 1159F MED LIST DOCD IN RCRD: CPT | Mod: CPTII,,, | Performed by: NURSE PRACTITIONER

## 2023-03-17 PROCEDURE — 3078F PR MOST RECENT DIASTOLIC BLOOD PRESSURE < 80 MM HG: ICD-10-PCS | Mod: CPTII,,, | Performed by: NURSE PRACTITIONER

## 2023-03-17 PROCEDURE — 1159F PR MEDICATION LIST DOCUMENTED IN MEDICAL RECORD: ICD-10-PCS | Mod: CPTII,,, | Performed by: NURSE PRACTITIONER

## 2023-03-17 PROCEDURE — 99396 PREV VISIT EST AGE 40-64: CPT | Mod: S$PBB,,, | Performed by: NURSE PRACTITIONER

## 2023-03-17 PROCEDURE — 3008F PR BODY MASS INDEX (BMI) DOCUMENTED: ICD-10-PCS | Mod: CPTII,,, | Performed by: NURSE PRACTITIONER

## 2023-03-17 PROCEDURE — 99396 PR PREVENTIVE VISIT,EST,40-64: ICD-10-PCS | Mod: S$PBB,,, | Performed by: NURSE PRACTITIONER

## 2023-03-17 PROCEDURE — 1160F PR REVIEW ALL MEDS BY PRESCRIBER/CLIN PHARMACIST DOCUMENTED: ICD-10-PCS | Mod: CPTII,,, | Performed by: NURSE PRACTITIONER

## 2023-03-17 PROCEDURE — 3066F NEPHROPATHY DOC TX: CPT | Mod: CPTII,,, | Performed by: NURSE PRACTITIONER

## 2023-03-17 PROCEDURE — 3008F BODY MASS INDEX DOCD: CPT | Mod: CPTII,,, | Performed by: NURSE PRACTITIONER

## 2023-03-17 PROCEDURE — 3066F PR DOCUMENTATION OF TREATMENT FOR NEPHROPATHY: ICD-10-PCS | Mod: CPTII,,, | Performed by: NURSE PRACTITIONER

## 2023-03-17 PROCEDURE — 3061F PR NEG MICROALBUMINURIA RESULT DOCUMENTED/REVIEW: ICD-10-PCS | Mod: CPTII,,, | Performed by: NURSE PRACTITIONER

## 2023-03-17 PROCEDURE — 3074F SYST BP LT 130 MM HG: CPT | Mod: CPTII,,, | Performed by: NURSE PRACTITIONER

## 2023-03-17 PROCEDURE — 3061F NEG MICROALBUMINURIA REV: CPT | Mod: CPTII,,, | Performed by: NURSE PRACTITIONER

## 2023-03-17 PROCEDURE — 3074F PR MOST RECENT SYSTOLIC BLOOD PRESSURE < 130 MM HG: ICD-10-PCS | Mod: CPTII,,, | Performed by: NURSE PRACTITIONER

## 2023-03-17 RX ORDER — FERROUS SULFATE, DRIED 160(50) MG
1 TABLET, EXTENDED RELEASE ORAL 2 TIMES DAILY WITH MEALS
COMMUNITY

## 2023-03-17 NOTE — PROGRESS NOTES
Lazara Hubbard, LAURA   [facility]  [address] [facility] [address]      PATIENT NAME: Barbara Diaz  : 1965  DATE: 3/17/23  MRN: [id number]      Reason for Visit / Chief Complaint: Diabetes (Wellness check with DM shoes and inserts, refused vaccines)       History of Present Illness / Problem Focused Workflow     Barbara Diaz is a 57 y.o. Black or  female presents to the clinic for annual visit. PMH DM, bilateral bunions, AR and obesity. Followed by [facility] wound [facility] GYN clinics. Pt completed last diabetic foot care on 23.    Today, continues to report following ADA diet. Reports bilateral ring trigger finger, L>R, that resolves with manual opening. Diabetic form completed for shoes and inserts; will fax to [employer] in [place]. Declines vaccines again today. Denies chest pain, shortness of breath, cough, fever, headache, dizziness, weakness, abdominal pain, nausea, vomiting, diarrhea, constipation, dysuria, depression, anxiety, SI/HI.    Review of Systems     Review of Systems     See HPI for details    Constitutional: Denies Change in appetite. Denies Chills. Denies Fever. Denies Night sweats.  Eye: Denies Blurred vision. Denies Discharge. Denies Eye pain.  ENT: Denies Decreased hearing. Denies Sore throat. Denies Swollen glands.  Respiratory: Denies Cough. Denies Shortness of breath. Denies Shortness of breath with exertion. Denies Wheezing.  Cardiovascular: Denies Chest pain at rest. Denies Chest pain with exertion. Denies Irregular heartbeat. Denies Palpitations. Denies Edema.  Gastrointestinal: Denies Abdominal pain. Denies Diarrhea. Denies Nausea. Denies Vomiting. Denies Hematemesis or Hematochezia.  Genitourinary: Denies Dysuria. Denies Urinary frequency. Denies Urinary urgency. Denies Blood in urine.  Endocrine: Denies Cold intolerance. Denies Excessive thirst. Denies Heat intolerance. Denies Weight loss.  Denies Weight gain.  Musculoskeletal: Admits Painful joints. Denies Weakness.  Integumentary: Denies Rash. Denies Itching. Denies Dry skin.  Neurologic: Denies Dizziness. Denies Fainting. Denies Headache.  Psychiatric: Denies Depression. Denies Anxiety. Denies Suicidal/Homicidal ideations.    All Other ROS: Negative except as stated in HPI.     Medical / Surgical / Social / Family History       ----------------------------  Allergy  Diabetes mellitus, type 2  GERD (gastroesophageal reflux disease)     Past Surgical History:   Procedure Laterality Date    TUBAL LIGATION         Social History     Socioeconomic History    Marital status:    Occupational History    Occupation: unemployed   Tobacco Use    Smoking status: Never    Smokeless tobacco: Never   Substance and Sexual Activity    Alcohol use: Yes     Comment: wine 1-2 times a month    Drug use: Never    Sexual activity: Yes     Social Determinants of Health     Transportation Needs: No Transportation Needs    Lack of Transportation (Medical): No    Lack of Transportation (Non-Medical): No   Social Connections: Moderately Isolated    Frequency of Communication with Friends and Family: More than three times a week    Frequency of Social Gatherings with Friends and Family: More than three times a week    Attends Jainism Services: 1 to 4 times per year    Active Member of Clubs or Organizations: No    Attends Club or Organization Meetings: Never    Marital Status: Never    Housing Stability: Low Risk     Unable to Pay for Housing in the Last Year: No    Number of Places Lived in the Last Year: 1    Unstable Housing in the Last Year: No        Family History   Problem Relation Age of Onset    Cancer Mother     Heart disease Mother     Prostate cancer Father     Diabetes type I Sister     Cerebral aneurysm Brother     Diabetes type I Brother     Hypertension Brother         Medications and Allergies     Medications  Current Outpatient Medications  "  Medication Instructions    calcium-vitamin D3 (OS-FANNIE 500 + D3) 500 mg-5 mcg (200 unit) per tablet 1 tablet, Oral, 2 times daily with meals    cetirizine (ZYRTEC) 10 mg, Oral, Daily PRN    fluticasone propionate (FLONASE) 50 mcg, Each Nostril, Daily    omeprazole (PRILOSEC) 20 mg, Oral, Daily PRN         Allergies  Review of patient's allergies indicates:   Allergen Reactions    Codeine        Physical Examination     /76 (BP Location: Right arm, Patient Position: Sitting, BP Method: Large (Automatic))   Pulse 67   Temp 98.4 °F (36.9 °C) (Oral)   Resp 18   Ht 5' 11.65" (1.82 m)   Wt 99.5 kg (219 lb 6.4 oz)   BMI 30.04 kg/m²     Physical Exam  Musculoskeletal:      Right foot: Bunion present.      Left foot: Bunion present.   Feet:      Right foot:      Skin integrity: Callus present.      Left foot:      Skin integrity: Callus present.      Comments: Bilateral hammertoes to 2nd toe and multiple corns      General: Alert and oriented, No acute distress.  Head: Normocephalic, Atraumatic.  Eye: Pupils are equal, round and reactive to light, Extraocular movements are intact, Sclera non-icteric.  Ears/Nose/Throat: Normal, Mucosa moist,Clear.  Neck/Thyroid: Supple, Non-tender, No carotid bruit, No lymphadenopathy, No JVD, Full range of motion.  Respiratory: Clear to auscultation bilaterally; No wheezes, rales or rhonchi,Non-labored respirations, Symmetrical chest wall expansion.  Cardiovascular: Regular rate and rhythm, S1/S2 normal, No murmurs, rubs or gallops.  Gastrointestinal: Soft, Non-tender, Non-distended, Normal bowel sounds, No palpable organomegaly.  Musculoskeletal: Normal range of motion.  Integumentary: Warm, Dry, Intact, No suspicious lesions or rashes.  Extremities: No clubbing, cyanosis or edema  Neurologic: No focal deficits, Cranial Nerves II-XII are grossly intact, Motor strength normal upper and lower extremities, Sensory exam intact.  Psychiatric: Normal interaction, Coherent speech, " Euthymic mood, Appropriate affect       Protective Sensation (w/ 10 gram monofilament):  Right: Intact  Left: Intact    Visual Inspection:  Nails Intact - with Evidence of Foot Deformity- Bilateral    Pedal Pulses:   Right: Present  Left: Present    Posterior Tibialis Pulses:   Right:Present  Left: Present        Results     Lab Results   Component Value Date    WBC 3.6 (L) 10/17/2022    HGB 12.7 10/17/2022    HCT 40.4 10/17/2022     10/17/2022    CHOL 196 01/30/2023    TRIG 66 01/30/2023    ALT 13 01/30/2023    AST 16 01/30/2023     01/30/2023    K 4.2 01/30/2023    CREATININE 0.81 01/30/2023    BUN 12.2 01/30/2023    CO2 25 01/30/2023    TSH 1.001 01/30/2023    HGBA1C 5.5 01/30/2023     Narrative & Impression  EXAMINATION  DXA BONE DENSITY AXIAL SKELETON 1 OR MORE SITES     CLINICAL HISTORY  Asymptomatic menopausal state     TECHNIQUE  Standard DEXA imaging was performed over the lumbar spine and hip(s).     COMPARISON  None available at the time of initial interpretation.     FINDINGS  L1 to L4 bone mineral density: 0.897 g/(cm^2); T-score: -2.4     Left total hip BMD: 0.947 g/(cm^2); T-score: -0.5     Right total hip BMD: 0.883 g/(cm^2); T-score: -1     Bilateral total hip mean BMD: 0.915 g/(cm^2); T-score: -0.7     IMPRESSION  1. Decreased lumbar spine and right hip bone mineral density (osteopenia).  2. Normal mineralization of the left hip.  ==========     Consider FDA approved medical therapies in postmenopausal women and men aged 50 years and older, based on the following:     *A hip or vertebral (clinical or morphometric) fracture.  *T-score less than or equal to -2.5 at the femoral neck or spine after appropriate evaluation to exclude secondary causes.  *Low bone mass (also known as osteopenia -- T-score between -1.0 and -2.5 at the femoral neck or spine) and a 10-year probability of hip fracture greater than or equal to 3%.  *A 10-year probability of major osteoporosis-related fracture  greater than or equal to 20% based on the [place]-adapted WHO algorithm.  *Overriding clinician judgment and/or patient preference may indicate treatment for people with 10-year fracture probability below these threshold levels.        Electronically signed by: Ed Waggoner  Date:                                            02/27/2023  Time:                                           21:05    Narrative & Impression      - MAMMO DIGITAL SCREENING BILAT WITH PROSPER     BILATERAL DIGITAL SCREENING MAMMOGRAM 3D/2D WITH CAD: 2/27/2023  HISTORY: 57-year-old woman presents for screening mammogram.       COMPARISONS: Comparison is made to exams dated:  12/9/2021 mammogram, 12/7/2020 mammogram, 10/8/2019 mammogram - [facility], 11/2/2015 mammogram, 11/14/2016 mammogram, and 9/14/2015 mammogram - [facility].       TECHNIQUE: Digital mammography views were performed with tomosynthesis. Current study was evaluated with a Computer Aided Detection (CAD) system.      BREAST COMPOSITION: There are scattered areas of fibroglandular tissue.       FINDINGS:   No suspicious mass, asymmetry, distortion, or calcification is identified.         IMPRESSION: NEGATIVE  Right Breast: Negative (BI-RADS 1)  Left Breast: Negative (BI-RADS 1)     Recommendations:  Recommend continued annual screening mammography, according to American College of Radiology guidelines.           Ramon Shore M.D.            lm/:3/1/2023 14:03:51          letter sent: Mammography Normal    Mammogram BI-RADS: 1 Negative     Latest Reference Range & Units 02/22/23 09:06   Cologuard Result Negative  Negative       Assessment and Plan (including Health Maintenance)     Plan:     1. Wellness examination  Cervical Cancer Screening - Last Pap/pelvic in 2021. Is scheduled for 3/2024. Follow up with GYN Clinic for annual Pap/Pelvic.  Breast Cancer Screening - Last Mammogram 2/27/23. Birads 1. Follow up annually.  Bone Screening - Last dexa on  2/27/23 revealed osteopenia.  Colon Cancer Screening - Cologuard negative on 2/23/23. Follow up annually.  Vaccinations: Flu - / Shingles /  Tetanus - declines all.  Labwork - UTD    2. Trigger finger, right ring finger  Education provided.  Declines referral to ortho; will contact if worsens.\  Splint recommended.  Ice prn.  Prop on pillow.    3. Trigger ring finger of left hand  Education provided.  Declines referral to ortho; will contact if worsens.\  Splint recommended.  Ice prn.  Prop on pillow.    5. Bilateral bunions  Diabetic shoe form completed.  Keep appts for DFC.  Reports worsening of symptoms.  - DIABETIC SHOES FOR HOME USE    6. Hammer toes, bilateral  Diabetic shoe form completed.  Keep appts for DFC.  Reports worsening of symptoms.  - DIABETIC SHOES FOR HOME USE        Problem List Items Addressed This Visit          Endocrine    Diabetes mellitus (Chronic)    Relevant Orders    CBC Auto Differential    Hemoglobin A1C    Comprehensive Metabolic Panel    DIABETIC SHOES FOR HOME USE    Class 1 obesity with body mass index (BMI) of 30.0 to 30.9 in adult (Chronic)       Orthopedic    Bilateral bunions (Chronic)    Relevant Orders    DIABETIC SHOES FOR HOME USE    Hammer toes, bilateral (Chronic)    Relevant Orders    DIABETIC SHOES FOR HOME USE    Deformity of both feet    Trigger finger, right ring finger    Trigger ring finger of left hand       Other    Wellness examination     Other Visit Diagnoses       Screening for diabetic retinopathy    -  Primary    Relevant Orders    Diabetic Eye Screening Photo (Completed)              Health Maintenance Due   Topic Date Due    Hepatitis C Screening  Never done    Cervical Cancer Screening  Never done    COVID-19 Vaccine (1) Never done    Pneumococcal Vaccines (Age 0-64) (1 - PCV) Never done    HIV Screening  Never done    TETANUS VACCINE  Never done    Low Dose Statin  Never done    Shingles Vaccine (1 of 2) Never done    Influenza Vaccine (1) Never done        Follow up in about 4 months (around 7/17/2023) for Follow up. Labs one week prior to appt. .        Signature:  LAURA Luna  [facility]  [address] [facility] [address]

## 2023-03-17 NOTE — PROGRESS NOTES
Barbara Diaz is a 57 y.o. female here for a diabetic eye screening with non-dilated fundus photos per LAURA Kelly.    Patient cooperative?: Yes  Small pupils?: No  Last eye exam: unknown    For exam results, see Encounter Report.

## 2023-03-17 NOTE — PATIENT INSTRUCTIONS
Purchase and take Calcium 600mg twice per day and Vitamin D 2000IU daily.   Maintain a healthy BMI of <30.  Weight bearing exercise such as walking or resistance training to build bones 5 times a week.  Perform strengthening, range of motion and low-impact aerobic exercises (walking, water aerobics, cycling) as recommended to control pain and improve joint function.  Avoid soda and excessive alcohol.  Use assistive devices as needed for ambulation and fall prevention.  Avoid falls by assessing home for loose cords and rugs, wearing proper footwear, and using proper lighting in rooms.   Repeat DEXA in 2 yrs.

## 2023-03-29 ENCOUNTER — DOCUMENTATION ONLY (OUTPATIENT)
Dept: ADMINISTRATIVE | Facility: HOSPITAL | Age: 58
End: 2023-03-29
Payer: MEDICAID

## 2023-06-19 PROBLEM — Z00.00 WELLNESS EXAMINATION: Status: RESOLVED | Noted: 2023-03-17 | Resolved: 2023-06-19

## 2023-08-18 ENCOUNTER — HOSPITAL ENCOUNTER (EMERGENCY)
Facility: HOSPITAL | Age: 58
Discharge: HOME | End: 2023-08-18
Payer: MEDICAID

## 2023-08-18 VITALS
WEIGHT: 209.5 LBS | SYSTOLIC BLOOD PRESSURE: 130 MMHG | OXYGEN SATURATION: 100 % | DIASTOLIC BLOOD PRESSURE: 78 MMHG | HEIGHT: 72 IN | BODY MASS INDEX: 28.38 KG/M2 | RESPIRATION RATE: 16 BRPM | HEART RATE: 68 BPM | TEMPERATURE: 99 F
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
## 2023-08-18 DIAGNOSIS — T07.XXXA MULTIPLE CONTUSIONS: ICD-10-CM

## 2023-08-18 DIAGNOSIS — T14.90XA TRAUMA: ICD-10-CM

## 2023-08-18 PROCEDURE — 99284 EMERGENCY DEPT VISIT MOD MDM: CPT

## 2023-08-18 RX ORDER — CYCLOBENZAPRINE HCL 10 MG
10 TABLET ORAL 3 TIMES DAILY PRN
Qty: 15 TABLET | Refills: 0 | Status: SHIPPED | OUTPATIENT
Start: 2023-08-18 | End: 2023-08-23
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
## 2024-09-09 ENCOUNTER — TELEPHONE (OUTPATIENT)
Dept: INTERNAL MEDICINE | Facility: CLINIC | Age: 59
End: 2024-09-09
Payer: MEDICAID

## 2025-06-12 ENCOUNTER — TELEPHONE (OUTPATIENT)
Dept: GYNECOLOGY | Facility: CLINIC | Age: 60
End: 2025-06-12

## 2025-06-12 ENCOUNTER — OFFICE VISIT (OUTPATIENT)
Dept: GYNECOLOGY | Facility: CLINIC | Age: 60
End: 2025-06-12
Payer: COMMERCIAL

## 2025-06-12 ENCOUNTER — RESULTS FOLLOW-UP (OUTPATIENT)
Dept: GYNECOLOGY | Facility: CLINIC | Age: 60
End: 2025-06-12

## 2025-06-12 VITALS
RESPIRATION RATE: 20 BRPM | DIASTOLIC BLOOD PRESSURE: 75 MMHG | HEART RATE: 79 BPM | SYSTOLIC BLOOD PRESSURE: 124 MMHG | WEIGHT: 218.81 LBS | OXYGEN SATURATION: 100 % | HEIGHT: 72 IN | TEMPERATURE: 99 F | BODY MASS INDEX: 29.64 KG/M2

## 2025-06-12 DIAGNOSIS — Z12.31 VISIT FOR SCREENING MAMMOGRAM: ICD-10-CM

## 2025-06-12 DIAGNOSIS — B96.89 BV (BACTERIAL VAGINOSIS): Primary | ICD-10-CM

## 2025-06-12 DIAGNOSIS — A59.01 TRICHOMONAS VAGINITIS: ICD-10-CM

## 2025-06-12 DIAGNOSIS — N76.0 BV (BACTERIAL VAGINOSIS): Primary | ICD-10-CM

## 2025-06-12 DIAGNOSIS — Z12.4 PAP SMEAR FOR CERVICAL CANCER SCREENING: Primary | ICD-10-CM

## 2025-06-12 DIAGNOSIS — N89.8 VAGINAL ITCHING: ICD-10-CM

## 2025-06-12 DIAGNOSIS — Z76.89 ESTABLISHING CARE WITH NEW DOCTOR, ENCOUNTER FOR: ICD-10-CM

## 2025-06-12 LAB
C TRACH DNA SPEC QL NAA+PROBE: NOT DETECTED
CLUE CELLS VAG QL WET PREP: ABNORMAL
N GONORRHOEA DNA SPEC QL NAA+PROBE: NOT DETECTED
SPECIMEN SOURCE: NORMAL
T VAGINALIS VAG QL WET PREP: ABNORMAL
WBC #/AREA VAG WET PREP: ABNORMAL
YEAST SPEC QL WET PREP: ABNORMAL

## 2025-06-12 PROCEDURE — 87210 SMEAR WET MOUNT SALINE/INK: CPT | Performed by: NURSE PRACTITIONER

## 2025-06-12 PROCEDURE — 87491 CHLMYD TRACH DNA AMP PROBE: CPT | Performed by: NURSE PRACTITIONER

## 2025-06-12 PROCEDURE — 88174 CYTOPATH C/V AUTO IN FLUID: CPT | Performed by: NURSE PRACTITIONER

## 2025-06-12 PROCEDURE — 99214 OFFICE O/P EST MOD 30 MIN: CPT | Mod: PBBFAC | Performed by: NURSE PRACTITIONER

## 2025-06-12 RX ORDER — METRONIDAZOLE 500 MG/1
500 TABLET ORAL 2 TIMES DAILY
Qty: 14 TABLET | Refills: 0 | Status: SHIPPED | OUTPATIENT
Start: 2025-06-12 | End: 2025-06-19

## 2025-06-12 NOTE — PROGRESS NOTES
[facility] -  Gynecology / Women's Health Clinic      Subjective:       Patient ID: Barbara Diaz is a 59 y.o. female.    Chief Complaint:  Gynecologic Exam    History of Present Illness  The patient  here for annual exam. Pt is postmenopausal. Denies history of abnormal paps. Last pap -NIL and HPV neg.  MG-3/1/23 & BIRADS 1. Denies breast or urinary complaints. Denies pelvic pain, abnormal bleeding. Pt does c/o vaginal itching and discharge x2-3 weeks. States she uses Vagisil wash, Scented Dove and takes baths. Pt does admit to being sexually active. Pt also c/o bump on buttock. Pt reports no STIs in the past and no concerns. Denies tobacco use. Dep. screening 0. Denies fly hx of breast, ovarian, uterine or colon cancer. DEXA -osteopenia. Cologuard neg in .    GYN & OB History  No LMP recorded. Patient is postmenopausal.   Date of Last Pap: 9/3/2020    Review of patient's allergies indicates:   Allergen Reactions    Codeine      Past Medical History:   Diagnosis Date    Allergy     Diabetes mellitus, type 2     GERD (gastroesophageal reflux disease)      OB History    Para Term  AB Living   3 3       SAB IAB Ectopic Multiple Live Births             # Outcome Date GA Lbr Imer/2nd Weight Sex Type Anes PTL Lv   3 Para            2 Para            1 Para                 Review of Systems  Review of Systems    Negative except for pertinent findings for positives per HPI     Objective:    Physical Exam    /75 (BP Location: Left arm, Patient Position: Sitting)   Pulse 79   Temp 98.6 °F (37 °C) (Oral)   Resp 20   Ht 6' (1.829 m)   Wt 99.2 kg (218 lb 12.8 oz)   SpO2 100%   BMI 29.67 kg/m²   GENERAL: Well-developed female. No acute distress.    SKIN: Normal to inspection, warm and intact. >1 cm bump palpated to Rt buttock, no open lesion  BREASTS: No rashes or erythema. No masses, lumps, discharge, tenderness.  VULVA: General appearance normal; external  genitalia with no lesions or erythema.  VAGINA: Mucosa/vaginal vault atrophic no abnormal discharge or lesions.  CERVIX: atrophic, nulliparous appearing os, friable, thin white discharge.  BIMANUAL EXAM: reveals a 12 week-sized uterus. The uterus is non tender. Kemar adnexa reveal no tenderness.  PSYCHIATRIC: Patient is oriented to person, place, and time. Mood and affect are normal.    Assessment:         ICD-10-CM ICD-9-CM   1. Pap smear for cervical cancer screening  Z12.4 V76.2   2. Visit for screening mammogram  Z12.31 V76.12   3. Vaginal itching  N89.8 698.1   4. Establishing care with new doctor, encounter for  Z76.89 V65.8     Plan:   Barbara was seen today for gynecologic exam.    Diagnoses and all orders for this visit:    Pap smear for cervical cancer screening  -     Liquid-Based Pap Smear, Screening    Visit for screening mammogram  -     Mammo Digital Screening Bilat w/ Miguel (XPD); Future    Vaginal itching  -     Chlamydia/GC, PCR  -     Wet Prep, Genital    Establishing care with new doctor, encounter for  -     Ambulatory referral/consult to Internal Medicine    Pap today  MG ordered  Wet prep and g/c  PCP referral  Follow up in about 1 year (around 6/12/2026) for annual exam.

## 2025-06-12 NOTE — TELEPHONE ENCOUNTER
Spoke to patient to inform of results and medication sent to the pharmacy on file. Educated patient on possible hygiene changes and safe sex practices. Patient verbalized understanding.

## 2025-06-30 ENCOUNTER — HOSPITAL ENCOUNTER (OUTPATIENT)
Dept: RADIOLOGY | Facility: HOSPITAL | Age: 60
Discharge: HOME | End: 2025-06-30
Attending: NURSE PRACTITIONER
Payer: COMMERCIAL

## 2025-06-30 DIAGNOSIS — Z12.31 VISIT FOR SCREENING MAMMOGRAM: ICD-10-CM

## 2025-06-30 PROCEDURE — 77067 SCR MAMMO BI INCL CAD: CPT | Mod: TC

## 2025-08-11 ENCOUNTER — TELEPHONE (OUTPATIENT)
Dept: FAMILY MEDICINE | Facility: CLINIC | Age: 60
End: 2025-08-11
Payer: COMMERCIAL

## 2025-08-13 ENCOUNTER — OFFICE VISIT (OUTPATIENT)
Dept: FAMILY MEDICINE | Facility: CLINIC | Age: 60
End: 2025-08-13
Payer: COMMERCIAL

## 2025-08-13 VITALS
BODY MASS INDEX: 34.06 KG/M2 | RESPIRATION RATE: 18 BRPM | HEIGHT: 67 IN | WEIGHT: 217 LBS | OXYGEN SATURATION: 99 % | DIASTOLIC BLOOD PRESSURE: 79 MMHG | TEMPERATURE: 98 F | HEART RATE: 80 BPM | SYSTOLIC BLOOD PRESSURE: 119 MMHG

## 2025-08-13 DIAGNOSIS — Z23 NEED FOR SHINGLES VACCINE: ICD-10-CM

## 2025-08-13 DIAGNOSIS — Z23 NEED FOR VACCINATION FOR STREP PNEUMONIAE: ICD-10-CM

## 2025-08-13 DIAGNOSIS — Z23 NEED FOR TETANUS, DIPHTHERIA, AND ACELLULAR PERTUSSIS (TDAP) VACCINE: ICD-10-CM

## 2025-08-13 DIAGNOSIS — R06.09 DOE (DYSPNEA ON EXERTION): ICD-10-CM

## 2025-08-13 DIAGNOSIS — M21.611 BILATERAL BUNIONS: Chronic | ICD-10-CM

## 2025-08-13 DIAGNOSIS — M65.341 TRIGGER RING FINGER OF RIGHT HAND: ICD-10-CM

## 2025-08-13 DIAGNOSIS — M21.612 BILATERAL BUNIONS: Chronic | ICD-10-CM

## 2025-08-13 DIAGNOSIS — K21.9 GASTROESOPHAGEAL REFLUX DISEASE WITHOUT ESOPHAGITIS: Chronic | ICD-10-CM

## 2025-08-13 DIAGNOSIS — E11.65 TYPE 2 DIABETES MELLITUS WITH HYPERGLYCEMIA, WITHOUT LONG-TERM CURRENT USE OF INSULIN: Chronic | ICD-10-CM

## 2025-08-13 DIAGNOSIS — E66.811 CLASS 1 OBESITY DUE TO EXCESS CALORIES WITHOUT SERIOUS COMORBIDITY WITH BODY MASS INDEX (BMI) OF 30.0 TO 30.9 IN ADULT: Chronic | ICD-10-CM

## 2025-08-13 DIAGNOSIS — Z11.59 ENCOUNTER FOR HEPATITIS C SCREENING TEST FOR LOW RISK PATIENT: ICD-10-CM

## 2025-08-13 DIAGNOSIS — Z13.6 ENCOUNTER FOR SCREENING FOR CARDIOVASCULAR DISORDERS: ICD-10-CM

## 2025-08-13 DIAGNOSIS — Z11.4 ENCOUNTER FOR SCREENING FOR HIV: ICD-10-CM

## 2025-08-13 DIAGNOSIS — Z00.00 WELLNESS EXAMINATION: ICD-10-CM

## 2025-08-13 DIAGNOSIS — Z76.89 ENCOUNTER TO ESTABLISH CARE: ICD-10-CM

## 2025-08-13 DIAGNOSIS — E66.09 CLASS 1 OBESITY DUE TO EXCESS CALORIES WITHOUT SERIOUS COMORBIDITY WITH BODY MASS INDEX (BMI) OF 30.0 TO 30.9 IN ADULT: Chronic | ICD-10-CM

## 2025-08-13 RX ORDER — ZOSTER VACCINE RECOMBINANT, ADJUVANTED 50 MCG/0.5
0.5 KIT INTRAMUSCULAR ONCE
Qty: 1 EACH | Refills: 1 | Status: SHIPPED | OUTPATIENT
Start: 2025-08-13 | End: 2025-08-13

## 2025-08-27 ENCOUNTER — HOSPITAL ENCOUNTER (OUTPATIENT)
Dept: RADIOLOGY | Facility: HOSPITAL | Age: 60
Discharge: HOME | End: 2025-08-27
Attending: STUDENT IN AN ORGANIZED HEALTH CARE EDUCATION/TRAINING PROGRAM
Payer: COMMERCIAL

## 2025-08-27 ENCOUNTER — HOSPITAL ENCOUNTER (OUTPATIENT)
Dept: RADIOLOGY | Facility: CLINIC | Age: 60
Discharge: HOME | End: 2025-08-27
Attending: NURSE PRACTITIONER
Payer: COMMERCIAL

## 2025-08-27 ENCOUNTER — OFFICE VISIT (OUTPATIENT)
Dept: ORTHOPEDICS | Facility: CLINIC | Age: 60
End: 2025-08-27
Payer: COMMERCIAL

## 2025-08-27 ENCOUNTER — TELEPHONE (OUTPATIENT)
Dept: FAMILY MEDICINE | Facility: CLINIC | Age: 60
End: 2025-08-27
Payer: COMMERCIAL

## 2025-08-27 VITALS — HEIGHT: 67 IN | WEIGHT: 218.63 LBS | BODY MASS INDEX: 34.31 KG/M2

## 2025-08-27 DIAGNOSIS — R06.09 DOE (DYSPNEA ON EXERTION): ICD-10-CM

## 2025-08-27 DIAGNOSIS — Z13.6 ENCOUNTER FOR SCREENING FOR CARDIOVASCULAR DISORDERS: ICD-10-CM

## 2025-08-27 DIAGNOSIS — M65.341 TRIGGER FINGER, RIGHT RING FINGER: Primary | ICD-10-CM

## 2025-08-27 DIAGNOSIS — M79.641 HAND PAIN, RIGHT: ICD-10-CM

## 2025-08-27 PROCEDURE — 73130 X-RAY EXAM OF HAND: CPT | Mod: RT,,, | Performed by: NURSE PRACTITIONER

## 2025-08-27 PROCEDURE — 71046 X-RAY EXAM CHEST 2 VIEWS: CPT | Mod: TC

## 2025-08-27 PROCEDURE — 75571 CT HRT W/O DYE W/CA TEST: CPT | Mod: TC

## 2025-08-27 RX ORDER — LIDOCAINE HYDROCHLORIDE 10 MG/ML
1 INJECTION, SOLUTION INFILTRATION; PERINEURAL
Status: DISCONTINUED | OUTPATIENT
Start: 2025-08-27 | End: 2025-08-27 | Stop reason: HOSPADM

## 2025-08-27 RX ORDER — BETAMETHASONE SODIUM PHOSPHATE AND BETAMETHASONE ACETATE 3; 3 MG/ML; MG/ML
6 INJECTION, SUSPENSION INTRA-ARTICULAR; INTRALESIONAL; INTRAMUSCULAR; SOFT TISSUE
Status: DISCONTINUED | OUTPATIENT
Start: 2025-08-27 | End: 2025-08-27 | Stop reason: HOSPADM

## 2025-08-27 RX ADMIN — LIDOCAINE HYDROCHLORIDE 1 ML: 10 INJECTION, SOLUTION INFILTRATION; PERINEURAL at 10:08

## 2025-08-27 RX ADMIN — BETAMETHASONE SODIUM PHOSPHATE AND BETAMETHASONE ACETATE 6 MG: 3; 3 INJECTION, SUSPENSION INTRA-ARTICULAR; INTRALESIONAL; INTRAMUSCULAR; SOFT TISSUE at 10:08

## 2025-09-04 ENCOUNTER — TELEPHONE (OUTPATIENT)
Dept: FAMILY MEDICINE | Facility: CLINIC | Age: 60
End: 2025-09-04
Payer: COMMERCIAL